# Patient Record
Sex: MALE | Race: WHITE | Employment: FULL TIME | URBAN - METROPOLITAN AREA
[De-identification: names, ages, dates, MRNs, and addresses within clinical notes are randomized per-mention and may not be internally consistent; named-entity substitution may affect disease eponyms.]

---

## 2020-08-10 ENCOUNTER — APPOINTMENT (OUTPATIENT)
Dept: GENERAL RADIOLOGY | Age: 40
End: 2020-08-10
Attending: EMERGENCY MEDICINE
Payer: COMMERCIAL

## 2020-08-10 ENCOUNTER — HOSPITAL ENCOUNTER (EMERGENCY)
Age: 40
Discharge: LEFT BEFORE DISCHARGE | End: 2020-08-10
Attending: EMERGENCY MEDICINE
Payer: COMMERCIAL

## 2020-08-10 VITALS
RESPIRATION RATE: 20 BRPM | BODY MASS INDEX: 31.83 KG/M2 | HEART RATE: 101 BPM | SYSTOLIC BLOOD PRESSURE: 152 MMHG | OXYGEN SATURATION: 96 % | HEIGHT: 72 IN | TEMPERATURE: 99.3 F | DIASTOLIC BLOOD PRESSURE: 82 MMHG | WEIGHT: 235 LBS

## 2020-08-10 DIAGNOSIS — R00.2 PALPITATIONS: Primary | ICD-10-CM

## 2020-08-10 DIAGNOSIS — R07.9 CHEST PAIN, UNSPECIFIED TYPE: ICD-10-CM

## 2020-08-10 LAB
ALBUMIN SERPL-MCNC: 3.9 G/DL (ref 3.5–5)
ALBUMIN/GLOB SERPL: 1 {RATIO} (ref 1.2–3.5)
ALP SERPL-CCNC: 95 U/L (ref 50–136)
ALT SERPL-CCNC: 45 U/L (ref 12–65)
ANION GAP SERPL CALC-SCNC: 11 MMOL/L (ref 7–16)
AST SERPL-CCNC: 29 U/L (ref 15–37)
ATRIAL RATE: 129 BPM
BASOPHILS # BLD: 0.1 K/UL (ref 0–0.2)
BASOPHILS NFR BLD: 1 % (ref 0–2)
BILIRUB SERPL-MCNC: 0.2 MG/DL (ref 0.2–1.1)
BUN SERPL-MCNC: 12 MG/DL (ref 6–23)
CALCIUM SERPL-MCNC: 8 MG/DL (ref 8.3–10.4)
CALCULATED P AXIS, ECG09: 56 DEGREES
CALCULATED R AXIS, ECG10: 58 DEGREES
CALCULATED T AXIS, ECG11: -4 DEGREES
CHLORIDE SERPL-SCNC: 102 MMOL/L (ref 98–107)
CO2 SERPL-SCNC: 26 MMOL/L (ref 21–32)
CREAT SERPL-MCNC: 1.23 MG/DL (ref 0.8–1.5)
D DIMER PPP FEU-MCNC: 0.28 UG/ML(FEU)
DIAGNOSIS, 93000: NORMAL
DIFFERENTIAL METHOD BLD: ABNORMAL
EOSINOPHIL # BLD: 0.1 K/UL (ref 0–0.8)
EOSINOPHIL NFR BLD: 1 % (ref 0.5–7.8)
ERYTHROCYTE [DISTWIDTH] IN BLOOD BY AUTOMATED COUNT: 13.2 % (ref 11.9–14.6)
GLOBULIN SER CALC-MCNC: 3.8 G/DL (ref 2.3–3.5)
GLUCOSE SERPL-MCNC: 100 MG/DL (ref 65–100)
HCT VFR BLD AUTO: 43.3 % (ref 41.1–50.3)
HGB BLD-MCNC: 14.6 G/DL (ref 13.6–17.2)
IMM GRANULOCYTES # BLD AUTO: 0 K/UL (ref 0–0.5)
IMM GRANULOCYTES NFR BLD AUTO: 0 % (ref 0–5)
LYMPHOCYTES # BLD: 4.2 K/UL (ref 0.5–4.6)
LYMPHOCYTES NFR BLD: 47 % (ref 13–44)
MCH RBC QN AUTO: 29.9 PG (ref 26.1–32.9)
MCHC RBC AUTO-ENTMCNC: 33.7 G/DL (ref 31.4–35)
MCV RBC AUTO: 88.5 FL (ref 79.6–97.8)
MONOCYTES # BLD: 0.6 K/UL (ref 0.1–1.3)
MONOCYTES NFR BLD: 7 % (ref 4–12)
NEUTS SEG # BLD: 4 K/UL (ref 1.7–8.2)
NEUTS SEG NFR BLD: 44 % (ref 43–78)
NRBC # BLD: 0 K/UL (ref 0–0.2)
P-R INTERVAL, ECG05: 150 MS
PLATELET # BLD AUTO: 405 K/UL (ref 150–450)
PMV BLD AUTO: 9.6 FL (ref 9.4–12.3)
POTASSIUM SERPL-SCNC: 3.6 MMOL/L (ref 3.5–5.1)
PROT SERPL-MCNC: 7.7 G/DL (ref 6.3–8.2)
Q-T INTERVAL, ECG07: 300 MS
QRS DURATION, ECG06: 88 MS
QTC CALCULATION (BEZET), ECG08: 439 MS
RBC # BLD AUTO: 4.89 M/UL (ref 4.23–5.6)
SODIUM SERPL-SCNC: 139 MMOL/L (ref 136–145)
TROPONIN-HIGH SENSITIVITY: 72.4 PG/ML (ref 0–14)
VENTRICULAR RATE, ECG03: 129 BPM
WBC # BLD AUTO: 9 K/UL (ref 4.3–11.1)

## 2020-08-10 PROCEDURE — 71045 X-RAY EXAM CHEST 1 VIEW: CPT

## 2020-08-10 PROCEDURE — 74011250636 HC RX REV CODE- 250/636: Performed by: EMERGENCY MEDICINE

## 2020-08-10 PROCEDURE — 84484 ASSAY OF TROPONIN QUANT: CPT

## 2020-08-10 PROCEDURE — 85025 COMPLETE CBC W/AUTO DIFF WBC: CPT

## 2020-08-10 PROCEDURE — 93005 ELECTROCARDIOGRAM TRACING: CPT | Performed by: EMERGENCY MEDICINE

## 2020-08-10 PROCEDURE — 99285 EMERGENCY DEPT VISIT HI MDM: CPT

## 2020-08-10 PROCEDURE — 80053 COMPREHEN METABOLIC PANEL: CPT

## 2020-08-10 PROCEDURE — 85379 FIBRIN DEGRADATION QUANT: CPT

## 2020-08-10 RX ADMIN — SODIUM CHLORIDE, SODIUM LACTATE, POTASSIUM CHLORIDE, AND CALCIUM CHLORIDE 1000 ML: 600; 310; 30; 20 INJECTION, SOLUTION INTRAVENOUS at 16:03

## 2020-08-10 NOTE — ED NOTES
RN walked by pt. Pt had removed IV and was standing by bed. MD bedside. Pt expressed that he wanted to leave. This RN and MD urged pt to stay to receive results of labs/speak with cardiology. Pt still wanting to leave.

## 2020-08-10 NOTE — ED TRIAGE NOTES
Patient ambulatory to triage without difficulty with mask in place. Patient reports left sided chest pain since Friday. Reports n/v/d and dizziness. Denies SOB, cough or fever.

## 2020-08-10 NOTE — ED PROVIDER NOTES
Sole Flanagan is a 42 y.o. male seen on 8/10/2020 at 3:38 PM in the [facility] EMERGENCY DEPT in room ERJ/J.    Chief Complaint   Patient presents with    Chest Pain     HPI: 53-year-old male presenting to the emergency department for evaluation of palpitations and chest pain. He states this is been going on for a couple of weeks. He recently was seen by [facility] cardiology and had a 2-day Holter monitor. This showed maximum heart rate of 162. He notes he is having chest discomfort as well as nausea and vomiting occasionally. He is not had any recent fractures, surgeries, no history of DVT no unilateral leg swelling or edema. He does note some shortness of breath when his heart starts to race. He notes this is worse with exertion, and when it is hot outside. Historian: patient    REVIEW OF SYSTEMS     Review of Systems   Constitutional: Negative for fatigue and fever. HENT: Negative. Eyes: Negative. Respiratory: Positive for shortness of breath. Negative for cough, chest tightness and wheezing. Cardiovascular: Positive for chest pain and palpitations. Negative for leg swelling. Gastrointestinal: Negative for abdominal distention, abdominal pain, diarrhea, nausea and vomiting. Genitourinary: Negative for dysuria, flank pain, frequency, genital sores and urgency. Musculoskeletal: Negative. Skin: Negative for rash. Neurological: Negative for dizziness, syncope and headaches. All other systems reviewed and are negative. PAST MEDICAL HISTORY     Past Medical History:   Diagnosis Date    ETOH abuse     Hypertension      No past surgical history on file.   Social History     Socioeconomic History    Marital status: SINGLE     Spouse name: Not on file    Number of children: Not on file    Years of education: Not on file    Highest education level: Not on file   Substance and Sexual Activity    Alcohol use: Yes     Comment: daily, 3\4 of a 5th    Drug use: No     Prior to Admission Medications   Prescriptions Last Dose Informant Patient Reported? Taking? ranitidine (ZANTAC) 150 mg tablet   No No   Sig: Take 1 Tab by mouth two (2) times a day. Facility-Administered Medications: None     Allergies   Allergen Reactions    Celebrex [Celecoxib] Other (comments)    Pcn [Penicillins] Other (comments)        PHYSICAL EXAM       Vitals:    08/10/20 1400 08/10/20 1505   BP: 125/86 (!) 149/91   Pulse: (!) 134 (!) 108   Resp: 20    Temp: 99.3 °F (37.4 °C)    SpO2: 95% 96%    Vital signs were reviewed. Physical Exam  Vitals signs reviewed. Constitutional:       General: He is not in acute distress. Appearance: He is well-developed. He is not diaphoretic. HENT:      Head: Normocephalic and atraumatic. Eyes:      Pupils: Pupils are equal, round, and reactive to light. Neck:      Musculoskeletal: Normal range of motion and neck supple. Cardiovascular:      Rate and Rhythm: Regular rhythm. Tachycardia present. Heart sounds: Normal heart sounds. No murmur. No friction rub. No gallop. Pulmonary:      Effort: Pulmonary effort is normal. No respiratory distress. Breath sounds: Normal breath sounds. No wheezing. Abdominal:      General: Bowel sounds are normal. There is no distension. Palpations: Abdomen is soft. There is no mass. Tenderness: There is no abdominal tenderness. There is no guarding or rebound. Musculoskeletal: Normal range of motion. General: No tenderness or deformity. Skin:     General: Skin is warm. Findings: No erythema or rash. Neurological:      Mental Status: He is alert and oriented to person, place, and time. Sensory: No sensory deficit. Coordination: Coordination normal.      Comments: No focal deficits   Psychiatric:         Behavior: Behavior normal.         Thought Content:  Thought content normal.          MEDICAL DECISION MAKING     MDM  Number of Diagnoses or Management Options  Chest pain, unspecified type:   Palpitations:      Amount and/or Complexity of Data Reviewed  Clinical lab tests: ordered and reviewed  Tests in the radiology section of CPT®: ordered and reviewed  Review and summarize past medical records: yes  Discuss the patient with other providers: yes    Risk of Complications, Morbidity, and/or Mortality  Presenting problems: high  Diagnostic procedures: moderate  Management options: moderate      Procedures    ED Course:  Pt not hypoxic, though tachy with CP. No significant PE risk factors. However, he  I have added a d-dimer. 4:21 PM  Pt noted to have an elevated hs-trop. Discussed with cardiology, they will see pt in the ED.    5:14 PM  I was called to the bedside by the nurse who states that the patient has become upset with his length of stay. He is pulled the IV out of his arm and states that he needs to leave the hospital.  I explained to him that I was concerned about a possible pulmonary embolism. Cardiology has come to the emergency department to evaluate him. He understands that not completing his care could result in the inability to diagnose a potentially life-threatening cause of symptoms. Disposition:  AMA  Diagnosis:  Chest pain, palpitations  ____________________________________________________________________  A portion of this note was generated using voice recognition dictation software. While the note has been reviewed for accuracy, please note certain words and phrases may not be transcribed as intended and some grammatical and/or typographical errors may be present.

## 2020-08-10 NOTE — DISCHARGE INSTRUCTIONS
You have chosen to leave [address] We have discussed the fact that this means we may not be able to identify the cause of your symptoms which could potentially be life-threatening. This could result in a very bad outcome including possible death. If you change your mind please return to the emergency department for further evaluation.

## 2020-08-11 ENCOUNTER — PATIENT OUTREACH (OUTPATIENT)
Dept: CASE MANAGEMENT | Age: 40
End: 2020-08-11

## 2020-08-11 NOTE — PROGRESS NOTES
1st Attempt to contact patient for [facility] Covid 19 risk education call, no answer on mobile number with message left on V/M.  Will attempt to contact patient again within 24 hours

## 2020-08-12 ENCOUNTER — PATIENT OUTREACH (OUTPATIENT)
Dept: CASE MANAGEMENT | Age: 40
End: 2020-08-12

## 2020-08-12 NOTE — PROGRESS NOTES
2nd  attempt to contact patient for [facility] Covid 19 education Call. No answer, on home number . Episode will be closed at this time as [address] has been unsuccessful in reaching the patient.  CC will be removed from the care team.

## 2021-06-23 PROBLEM — G47.26 SHIFT WORK SLEEP DISORDER: Status: ACTIVE | Noted: 2021-06-23

## 2021-06-23 PROBLEM — G47.33 OSA (OBSTRUCTIVE SLEEP APNEA): Status: ACTIVE | Noted: 2021-06-23

## 2021-06-23 PROBLEM — G47.00 PERSISTENT DISORDER OF INITIATING OR MAINTAINING SLEEP: Status: ACTIVE | Noted: 2021-06-23

## 2021-07-11 ENCOUNTER — APPOINTMENT (OUTPATIENT)
Dept: GENERAL RADIOLOGY | Age: 41
DRG: 917 | End: 2021-07-11
Attending: EMERGENCY MEDICINE
Payer: COMMERCIAL

## 2021-07-11 ENCOUNTER — HOSPITAL ENCOUNTER (INPATIENT)
Age: 41
LOS: 1 days | Discharge: LEFT BEFORE DISCHARGE | DRG: 917 | End: 2021-07-12
Attending: EMERGENCY MEDICINE | Admitting: HOSPITALIST
Payer: COMMERCIAL

## 2021-07-11 DIAGNOSIS — K29.21 ACUTE ALCOHOLIC GASTRITIS WITH HEMORRHAGE: ICD-10-CM

## 2021-07-11 DIAGNOSIS — T51.2X1A ISOPROPYL ALCOHOL POISONING: Primary | ICD-10-CM

## 2021-07-11 DIAGNOSIS — N17.9 ACUTE KIDNEY INJURY (HCC): ICD-10-CM

## 2021-07-11 LAB
ALBUMIN SERPL-MCNC: 4.2 G/DL (ref 3.5–5)
ALBUMIN/GLOB SERPL: 1 {RATIO} (ref 1.2–3.5)
ALP SERPL-CCNC: 95 U/L (ref 50–136)
ALT SERPL-CCNC: 70 U/L (ref 12–65)
ANION GAP SERPL CALC-SCNC: 10 MMOL/L (ref 7–16)
ANION GAP SERPL CALC-SCNC: 13 MMOL/L (ref 7–16)
APPEARANCE UR: CLEAR
AST SERPL-CCNC: 30 U/L (ref 15–37)
BACTERIA URNS QL MICRO: 0 /HPF
BASE EXCESS BLDV CALC-SCNC: 5 MMOL/L
BASOPHILS # BLD: 0 K/UL (ref 0–0.2)
BASOPHILS NFR BLD: 0 % (ref 0–2)
BILIRUB SERPL-MCNC: 0.5 MG/DL (ref 0.2–1.1)
BILIRUB UR QL: ABNORMAL
BNP SERPL-MCNC: 12 PG/ML (ref 5–125)
BUN SERPL-MCNC: 9 MG/DL (ref 6–23)
BUN SERPL-MCNC: 9 MG/DL (ref 6–23)
CALCIUM SERPL-MCNC: 7.8 MG/DL (ref 8.3–10.4)
CALCIUM SERPL-MCNC: 8.8 MG/DL (ref 8.3–10.4)
CASTS URNS QL MICRO: ABNORMAL /LPF
CHLORIDE SERPL-SCNC: 102 MMOL/L (ref 98–107)
CHLORIDE SERPL-SCNC: 98 MMOL/L (ref 98–107)
CO2 BLD-SCNC: 22 MMOL/L (ref 13–23)
CO2 SERPL-SCNC: 25 MMOL/L (ref 21–32)
CO2 SERPL-SCNC: 28 MMOL/L (ref 21–32)
COLOR UR: ABNORMAL
CREAT SERPL-MCNC: 1.94 MG/DL (ref 0.8–1.5)
CREAT SERPL-MCNC: 2.47 MG/DL (ref 0.8–1.5)
CREAT UR-MCNC: 367 MG/DL
DIFFERENTIAL METHOD BLD: ABNORMAL
EOSINOPHIL # BLD: 0 K/UL (ref 0–0.8)
EOSINOPHIL NFR BLD: 0 % (ref 0.5–7.8)
EPI CELLS #/AREA URNS HPF: ABNORMAL /HPF
ERYTHROCYTE [DISTWIDTH] IN BLOOD BY AUTOMATED COUNT: 14.8 % (ref 11.9–14.6)
ETHANOL SERPL-MCNC: <3 MG/DL
GAS FLOW.O2 O2 DELIVERY SYS: ABNORMAL L/MIN
GLOBULIN SER CALC-MCNC: 4.1 G/DL (ref 2.3–3.5)
GLUCOSE SERPL-MCNC: 128 MG/DL (ref 65–100)
GLUCOSE SERPL-MCNC: 161 MG/DL (ref 65–100)
GLUCOSE UR STRIP.AUTO-MCNC: NEGATIVE MG/DL
HCO3 BLDV-SCNC: 22 MMOL/L (ref 23–28)
HCT VFR BLD AUTO: 54.7 % (ref 41.1–50.3)
HGB BLD-MCNC: 15.9 G/DL (ref 13.6–17.2)
HGB BLD-MCNC: 18.6 G/DL (ref 13.6–17.2)
HGB UR QL STRIP: NEGATIVE
IMM GRANULOCYTES # BLD AUTO: 0.1 K/UL (ref 0–0.5)
IMM GRANULOCYTES NFR BLD AUTO: 1 % (ref 0–5)
KETONES UR QL STRIP.AUTO: >80 MG/DL
LEUKOCYTE ESTERASE UR QL STRIP.AUTO: NEGATIVE
LIPASE SERPL-CCNC: 141 U/L (ref 73–393)
LYMPHOCYTES # BLD: 1.6 K/UL (ref 0.5–4.6)
LYMPHOCYTES NFR BLD: 12 % (ref 13–44)
MAGNESIUM SERPL-MCNC: 2 MG/DL (ref 1.8–2.4)
MCH RBC QN AUTO: 30.4 PG (ref 26.1–32.9)
MCHC RBC AUTO-ENTMCNC: 34 G/DL (ref 31.4–35)
MCV RBC AUTO: 89.4 FL (ref 79.6–97.8)
MONOCYTES # BLD: 0.6 K/UL (ref 0.1–1.3)
MONOCYTES NFR BLD: 5 % (ref 4–12)
NEUTS SEG # BLD: 11 K/UL (ref 1.7–8.2)
NEUTS SEG NFR BLD: 83 % (ref 43–78)
NITRITE UR QL STRIP.AUTO: NEGATIVE
NRBC # BLD: 0 K/UL (ref 0–0.2)
PCO2 BLDV: 18.7 MMHG (ref 41–51)
PH BLDV: 7.68 [PH] (ref 7.32–7.42)
PH UR STRIP: 8 [PH] (ref 5–9)
PLATELET # BLD AUTO: 497 K/UL (ref 150–450)
PMV BLD AUTO: 9.3 FL (ref 9.4–12.3)
PO2 BLDV: 29 MMHG
POTASSIUM SERPL-SCNC: 3.2 MMOL/L (ref 3.5–5.1)
POTASSIUM SERPL-SCNC: 3.4 MMOL/L (ref 3.5–5.1)
PROT SERPL-MCNC: 8.3 G/DL (ref 6.3–8.2)
PROT UR STRIP-MCNC: 100 MG/DL
RBC # BLD AUTO: 6.12 M/UL (ref 4.23–5.6)
RBC #/AREA URNS HPF: ABNORMAL /HPF
SAO2 % BLDV: 74.2 % (ref 65–88)
SERVICE CMNT-IMP: ABNORMAL
SERVICE CMNT-IMP: ABNORMAL
SODIUM SERPL-SCNC: 136 MMOL/L (ref 138–145)
SODIUM SERPL-SCNC: 140 MMOL/L (ref 138–145)
SODIUM UR-SCNC: 59 MMOL/L
SP GR UR REFRACTOMETRY: 1.03 (ref 1–1.02)
SPECIMEN TYPE: ABNORMAL
UROBILINOGEN UR QL STRIP.AUTO: 1 EU/DL (ref 0.2–1)
WBC # BLD AUTO: 13.2 K/UL (ref 4.3–11.1)
WBC URNS QL MICRO: ABNORMAL /HPF

## 2021-07-11 PROCEDURE — 82803 BLOOD GASES ANY COMBINATION: CPT

## 2021-07-11 PROCEDURE — 84300 ASSAY OF URINE SODIUM: CPT

## 2021-07-11 PROCEDURE — 74011000250 HC RX REV CODE- 250: Performed by: HOSPITALIST

## 2021-07-11 PROCEDURE — 82570 ASSAY OF URINE CREATININE: CPT

## 2021-07-11 PROCEDURE — C9113 INJ PANTOPRAZOLE SODIUM, VIA: HCPCS | Performed by: HOSPITALIST

## 2021-07-11 PROCEDURE — C9113 INJ PANTOPRAZOLE SODIUM, VIA: HCPCS | Performed by: EMERGENCY MEDICINE

## 2021-07-11 PROCEDURE — 83735 ASSAY OF MAGNESIUM: CPT

## 2021-07-11 PROCEDURE — 83690 ASSAY OF LIPASE: CPT

## 2021-07-11 PROCEDURE — 74011250636 HC RX REV CODE- 250/636: Performed by: HOSPITALIST

## 2021-07-11 PROCEDURE — 96375 TX/PRO/DX INJ NEW DRUG ADDON: CPT

## 2021-07-11 PROCEDURE — 82077 ASSAY SPEC XCP UR&BREATH IA: CPT

## 2021-07-11 PROCEDURE — 99284 EMERGENCY DEPT VISIT MOD MDM: CPT

## 2021-07-11 PROCEDURE — 74022 RADEX COMPL AQT ABD SERIES: CPT

## 2021-07-11 PROCEDURE — 85018 HEMOGLOBIN: CPT

## 2021-07-11 PROCEDURE — 96361 HYDRATE IV INFUSION ADD-ON: CPT

## 2021-07-11 PROCEDURE — 85025 COMPLETE CBC W/AUTO DIFF WBC: CPT

## 2021-07-11 PROCEDURE — 80053 COMPREHEN METABOLIC PANEL: CPT

## 2021-07-11 PROCEDURE — 74011000250 HC RX REV CODE- 250: Performed by: EMERGENCY MEDICINE

## 2021-07-11 PROCEDURE — 36415 COLL VENOUS BLD VENIPUNCTURE: CPT

## 2021-07-11 PROCEDURE — 65660000000 HC RM CCU STEPDOWN

## 2021-07-11 PROCEDURE — 83880 ASSAY OF NATRIURETIC PEPTIDE: CPT

## 2021-07-11 PROCEDURE — 74011250637 HC RX REV CODE- 250/637: Performed by: HOSPITALIST

## 2021-07-11 PROCEDURE — 81001 URINALYSIS AUTO W/SCOPE: CPT

## 2021-07-11 PROCEDURE — 96374 THER/PROPH/DIAG INJ IV PUSH: CPT

## 2021-07-11 PROCEDURE — 74011250636 HC RX REV CODE- 250/636: Performed by: EMERGENCY MEDICINE

## 2021-07-11 RX ORDER — LORAZEPAM 2 MG/ML
1 INJECTION INTRAMUSCULAR
Status: DISCONTINUED | OUTPATIENT
Start: 2021-07-11 | End: 2021-07-12 | Stop reason: HOSPADM

## 2021-07-11 RX ORDER — SODIUM CHLORIDE 9 MG/ML
150 INJECTION, SOLUTION INTRAVENOUS CONTINUOUS
Status: DISCONTINUED | OUTPATIENT
Start: 2021-07-11 | End: 2021-07-12 | Stop reason: HOSPADM

## 2021-07-11 RX ORDER — LORAZEPAM 2 MG/ML
1 INJECTION INTRAMUSCULAR
Status: COMPLETED | OUTPATIENT
Start: 2021-07-11 | End: 2021-07-11

## 2021-07-11 RX ORDER — SODIUM CHLORIDE 0.9 % (FLUSH) 0.9 %
5-40 SYRINGE (ML) INJECTION AS NEEDED
Status: DISCONTINUED | OUTPATIENT
Start: 2021-07-11 | End: 2021-07-12 | Stop reason: HOSPADM

## 2021-07-11 RX ORDER — ONDANSETRON 2 MG/ML
4 INJECTION INTRAMUSCULAR; INTRAVENOUS
Status: DISCONTINUED | OUTPATIENT
Start: 2021-07-11 | End: 2021-07-12 | Stop reason: HOSPADM

## 2021-07-11 RX ORDER — LANOLIN ALCOHOL/MO/W.PET/CERES
100 CREAM (GRAM) TOPICAL DAILY
Status: DISCONTINUED | OUTPATIENT
Start: 2021-07-11 | End: 2021-07-12 | Stop reason: HOSPADM

## 2021-07-11 RX ORDER — ONDANSETRON 2 MG/ML
4 INJECTION INTRAMUSCULAR; INTRAVENOUS
Status: COMPLETED | OUTPATIENT
Start: 2021-07-11 | End: 2021-07-11

## 2021-07-11 RX ORDER — MORPHINE SULFATE 4 MG/ML
4 INJECTION INTRAVENOUS
Status: COMPLETED | OUTPATIENT
Start: 2021-07-11 | End: 2021-07-11

## 2021-07-11 RX ORDER — SENNOSIDES 8.6 MG/1
2 TABLET ORAL
Status: DISCONTINUED | OUTPATIENT
Start: 2021-07-11 | End: 2021-07-12 | Stop reason: HOSPADM

## 2021-07-11 RX ORDER — CHLORDIAZEPOXIDE HYDROCHLORIDE 5 MG/1
10 CAPSULE, GELATIN COATED ORAL 3 TIMES DAILY
Status: DISCONTINUED | OUTPATIENT
Start: 2021-07-11 | End: 2021-07-12 | Stop reason: HOSPADM

## 2021-07-11 RX ORDER — SODIUM CHLORIDE 0.9 % (FLUSH) 0.9 %
5-40 SYRINGE (ML) INJECTION EVERY 8 HOURS
Status: DISCONTINUED | OUTPATIENT
Start: 2021-07-11 | End: 2021-07-12 | Stop reason: HOSPADM

## 2021-07-11 RX ADMIN — Medication 100 MG: at 18:13

## 2021-07-11 RX ADMIN — ONDANSETRON 4 MG: 2 INJECTION INTRAMUSCULAR; INTRAVENOUS at 20:47

## 2021-07-11 RX ADMIN — LORAZEPAM 1 MG: 2 INJECTION INTRAMUSCULAR; INTRAVENOUS at 23:42

## 2021-07-11 RX ADMIN — SODIUM CHLORIDE 40 MG: 9 INJECTION, SOLUTION INTRAMUSCULAR; INTRAVENOUS; SUBCUTANEOUS at 20:47

## 2021-07-11 RX ADMIN — LORAZEPAM 1 MG: 2 INJECTION INTRAMUSCULAR; INTRAVENOUS at 14:43

## 2021-07-11 RX ADMIN — ONDANSETRON 4 MG: 2 INJECTION INTRAMUSCULAR; INTRAVENOUS at 14:43

## 2021-07-11 RX ADMIN — SODIUM CHLORIDE 1000 ML: 900 INJECTION, SOLUTION INTRAVENOUS at 16:23

## 2021-07-11 RX ADMIN — CHLORDIAZEPOXIDE HYDROCHLORIDE 10 MG: 5 CAPSULE ORAL at 18:13

## 2021-07-11 RX ADMIN — SODIUM CHLORIDE 150 ML/HR: 9 INJECTION, SOLUTION INTRAVENOUS at 16:05

## 2021-07-11 RX ADMIN — MORPHINE SULFATE 4 MG: 4 INJECTION INTRAVENOUS at 16:23

## 2021-07-11 RX ADMIN — CHLORDIAZEPOXIDE HYDROCHLORIDE 10 MG: 5 CAPSULE ORAL at 21:04

## 2021-07-11 RX ADMIN — SODIUM CHLORIDE 1000 ML: 900 INJECTION, SOLUTION INTRAVENOUS at 14:43

## 2021-07-11 RX ADMIN — SODIUM CHLORIDE 10 ML: 9 INJECTION, SOLUTION INTRAMUSCULAR; INTRAVENOUS; SUBCUTANEOUS at 18:30

## 2021-07-11 RX ADMIN — PANTOPRAZOLE SODIUM 80 MG: 40 INJECTION, POWDER, FOR SOLUTION INTRAVENOUS at 16:23

## 2021-07-11 RX ADMIN — LORAZEPAM 1 MG: 2 INJECTION INTRAMUSCULAR; INTRAVENOUS at 18:13

## 2021-07-11 RX ADMIN — SODIUM CHLORIDE 10 ML: 9 INJECTION, SOLUTION INTRAMUSCULAR; INTRAVENOUS; SUBCUTANEOUS at 21:04

## 2021-07-11 NOTE — ED NOTES
Patient noted to have period of hypoxia. Patient alert able to interact appropriately. Per wife patient with history of sleep apnea and CPAP at home. Patient placed on 2L NC while sleeping o2 sat improved to 98%. MD notified.

## 2021-07-11 NOTE — ED PROVIDER NOTES
29-year-old white male history of alcohol abuse presents with nausea vomiting and abdominal pain over the past 2 days. States he drinks fairly heavily daily and yesterday did not have any alcohol so drank a half a bottle of rubbing alcohol. Pain is epigastric and left lower quadrant. No diarrhea, fever or urinary changes. Also reports seeing some red and black blood in his emesis. The history is provided by the patient. Abdominal Pain   Associated symptoms include nausea and vomiting. Pertinent negatives include no fever, no dysuria, no headaches, no chest pain and no back pain. Vomiting   Associated symptoms include abdominal pain. Pertinent negatives include no fever, no headaches, no cough and no headaches. Past Medical History:   Diagnosis Date    ETOH abuse     Hypertension     Sleep apnea        No past surgical history on file. Family History:   Problem Relation Age of Onset    Hypertension Father     Stroke Father        Social History     Socioeconomic History    Marital status: SINGLE     Spouse name: Not on file    Number of children: Not on file    Years of education: Not on file    Highest education level: Not on file   Occupational History    Not on file   Tobacco Use    Smoking status: Never Smoker    Smokeless tobacco: Never Used   Substance and Sexual Activity    Alcohol use: Not Currently     Comment: daily, 3\4 of a 5th    Drug use: No    Sexual activity: Not on file   Other Topics Concern    Not on file   Social History Narrative    Not on file     Social Determinants of Health     Financial Resource Strain:     Difficulty of Paying Living Expenses:    Food Insecurity:     Worried About Running Out of Food in the Last Year:     [address] in the Last Year:    Transportation Needs:     Lack of Transportation (Medical):      Lack of Transportation (Non-Medical):    Physical Activity:     Days of Exercise per Week:     Minutes of Exercise per Session:    Stress:     Feeling of Stress :    Social Connections:     Frequency of Communication with Friends and Family:     Frequency of Social Gatherings with Friends and Family:     Attends Sabianism Services:     Active Member of Clubs or Organizations:     Attends Club or Organization Meetings:     Marital Status:    Intimate Partner Violence:     Fear of Current or Ex-Partner:     Emotionally Abused:     Physically Abused:     Sexually Abused: ALLERGIES: Celebrex [celecoxib] and Pcn [penicillins]    Review of Systems   Constitutional: Negative for fever. HENT: Negative for congestion. Respiratory: Negative for cough and shortness of breath. Cardiovascular: Negative for chest pain. Gastrointestinal: Positive for abdominal pain, nausea and vomiting. Genitourinary: Negative for dysuria. Musculoskeletal: Negative for back pain. Skin: Negative for rash. Neurological: Negative for headaches. All other systems reviewed and are negative. Vitals:    07/11/21 1258   BP: 114/74   Pulse: (!) 121   Resp: 16   Temp: 98.1 °F (36.7 °C)   SpO2: 97%   Weight: 106.6 kg (235 lb)   Height: 6' (1.829 m)            Physical Exam  Vitals and nursing note reviewed. Constitutional:       General: He is not in acute distress. Appearance: Normal appearance. He is not toxic-appearing. HENT:      Head: Normocephalic and atraumatic. Nose: Nose normal.      Mouth/Throat:      Mouth: Mucous membranes are moist.      Pharynx: Oropharynx is clear. Eyes:      Conjunctiva/sclera: Conjunctivae normal.      Pupils: Pupils are equal, round, and reactive to light. Cardiovascular:      Rate and Rhythm: Tachycardia present. Heart sounds: No murmur heard. Pulmonary:      Effort: Pulmonary effort is normal.      Breath sounds: Normal breath sounds. Abdominal:      Palpations: Abdomen is soft. Tenderness: There is abdominal tenderness in the epigastric area.  There is no right CVA tenderness, left CVA tenderness, guarding or rebound. Musculoskeletal:         General: No swelling. Normal range of motion. Cervical back: Normal range of motion and neck supple. Skin:     General: Skin is warm and dry. Neurological:      Mental Status: He is alert and oriented to person, place, and time. Psychiatric:         Mood and Affect: Mood normal.         Behavior: Behavior normal.          MDM  Number of Diagnoses or Management Options  Diagnosis management comments: Blood work shows leukocytosis 13.2, elevated H&H 18.6 and 54.7 and elevated platelets 310. Basic panel shows elevated creatinine 2.47 glucose 161 normal anion gap and CO2. LFTs normal.  Alcohol 0. Patient treated with IV fluids, morphine, Zofran and Ativan. Continues to complain of abdominal pain and is still tachycardic. Due to elevated creatinine will discuss with hospitalist for admission for treatment of isopropyl alcohol poisoning and suspected gastritis.        Amount and/or Complexity of Data Reviewed  Clinical lab tests: ordered and reviewed  Tests in the medicine section of CPT®: ordered and reviewed    Risk of Complications, Morbidity, and/or Mortality  Presenting problems: high  Diagnostic procedures: high  Management options: high           Procedures

## 2021-07-11 NOTE — PROGRESS NOTES
Provided urine specimen cup and instructed to collect urine sample. Applied bilateral SCDs. Dual full body skin assessment completed by Bradley Dsouza RN and Lew Yap RN. Elbows intact without redness or breakdown. Sacrum intact without redness or breakdown. Bilateral heels intact with dry callous skin.

## 2021-07-11 NOTE — ROUTINE PROCESS
TRANSFER - IN REPORT:    Verbal report received from DOLORES Johnson (name) on Ricardo Altman  being received from ED (unit) for routine progression of care      Report consisted of patients Situation, Background, Assessment and   Recommendations(SBAR). Information from the following report(s) SBAR, Kardex and ED Summary was reviewed with the receiving nurse. Opportunity for questions and clarification was provided. Assessment completed upon patients arrival to unit and care assumed. SBAR received and given to primary receiving RN, Sridhar Rojas. Patient not on 8th @ transfer-in time.

## 2021-07-11 NOTE — ED TRIAGE NOTES
Pt ambulatory to triage. Pt states he has n/v with blood in emesis x 10 hours. Denies diarrhea. Pt reports abd pain in LLQ. Reports drinking ETOH yesterday and feels like he may have drank too much. Pt denies daily drinking. Pt states \"I have a drinking problem\" and reports binge drinking about once every 2 weeks. Pt last had withdrawals from ETOH about a week ago. Pt states he would like help for his ETOH problem. Pt has been to rehab before.

## 2021-07-11 NOTE — H&P
INTERNAL MEDICINE H&P/CONSULT    Subjective:     46-year-old white male history of alcohol abuse, LOWELL, presents with nausea vomiting and abdominal pain over the past 2 days. States he drinks fairly heavily daily and yesterday did not have any alcohol so drank a half a bottle of rubbing alcohol. Pain is epigastric and left lower quadrant. No diarrhea, fever or urinary changes. Also reports seeing some red and black blood in his emesis. On further questioning, he vomited pur blood for last 3 days w/ out reporting diarrhea or black stools. He states he had bled from stomach ulcers in the past but never had endoscopy done. He states he do not drink alcohol daily but not infrequently he does to deal with ''anxiety''. Past Medical History:   Diagnosis Date    ETOH abuse     Hypertension     Sleep apnea       No past surgical history on file. Prior to Admission medications    Medication Sig Start Date End Date Taking? Authorizing Provider   Trintellix 20 mg tablet  6/7/21   Provider, Historical     Allergies   Allergen Reactions    Celebrex [Celecoxib] Other (comments)     Patient states he is not allergic     Pcn [Penicillins] Other (comments)     Patient states he is not allergic. Social History     Tobacco Use    Smoking status: Never Smoker    Smokeless tobacco: Never Used   Substance Use Topics    Alcohol use: Not Currently     Comment: daily, 3\4 of a 5th        Family History:  HTN    Review of Systems   A comprehensive review of systems was negative except for that written in the HPI. Objective: Intake / Output:  07/11 0701 - 07/11 1900  In: 1000 [I.V.:1000]  Out: -   No intake/output data recorded.     Physical Exam:  Visit Vitals  /71   Pulse (!) 121   Temp 98.1 °F (36.7 °C)   Resp 16   Ht 6' (1.829 m)   Wt 106.6 kg (235 lb)   SpO2 99%   BMI 31.87 kg/m²     General appearance: awake, alert, cooperative, moderate distress, appears stated age - not Pale, No icterus, Dry mucous membranes, obese  Head: Normocephalic, without obvious abnormality, atraumatic  Back: symmetric, no curvature. ROM normal. No CVA tenderness. Lungs: clear to auscultation bilaterally   Heart: regular rate and rhythm, S1, S2 normal, no murmur, click, rub or gallop  Abdomen: soft, minimal epigastric tenderness, no distension, normal bowel sound, no masses, no organomegaly  Extremities: atraumatic, no cyanosis - Bilateral lower limbs edema none. Skin: No rashes or ulceration. Neurologic: Grossly intact     ECG: sinus rhythm     Data Review (Labs):   Recent Results (from the past 24 hour(s))   CBC WITH AUTOMATED DIFF    Collection Time: 07/11/21  1:01 PM   Result Value Ref Range    WBC 13.2 (H) 4.3 - 11.1 K/uL    RBC 6.12 (H) 4.23 - 5.6 M/uL    HGB 18.6 (H) 13.6 - 17.2 g/dL    HCT 54.7 (H) 41.1 - 50.3 %    MCV 89.4 79.6 - 97.8 FL    MCH 30.4 26.1 - 32.9 PG    MCHC 34.0 31.4 - 35.0 g/dL    RDW 14.8 (H) 11.9 - 14.6 %    PLATELET 637 (H) 145 - 450 K/uL    MPV 9.3 (L) 9.4 - 12.3 FL    ABSOLUTE NRBC 0.00 0.0 - 0.2 K/uL    DF AUTOMATED      NEUTROPHILS 83 (H) 43 - 78 %    LYMPHOCYTES 12 (L) 13 - 44 %    MONOCYTES 5 4.0 - 12.0 %    EOSINOPHILS 0 (L) 0.5 - 7.8 %    BASOPHILS 0 0.0 - 2.0 %    IMMATURE GRANULOCYTES 1 0.0 - 5.0 %    ABS. NEUTROPHILS 11.0 (H) 1.7 - 8.2 K/UL    ABS. LYMPHOCYTES 1.6 0.5 - 4.6 K/UL    ABS. MONOCYTES 0.6 0.1 - 1.3 K/UL    ABS. EOSINOPHILS 0.0 0.0 - 0.8 K/UL    ABS. BASOPHILS 0.0 0.0 - 0.2 K/UL    ABS. IMM.  GRANS. 0.1 0.0 - 0.5 K/UL   METABOLIC PANEL, COMPREHENSIVE    Collection Time: 07/11/21  1:01 PM   Result Value Ref Range    Sodium 136 (L) 138 - 145 mmol/L    Potassium 3.4 (L) 3.5 - 5.1 mmol/L    Chloride 98 98 - 107 mmol/L    CO2 25 21 - 32 mmol/L    Anion gap 13 7 - 16 mmol/L    Glucose 161 (H) 65 - 100 mg/dL    BUN 9 6 - 23 MG/DL    Creatinine 2.47 (H) 0.8 - 1.5 MG/DL    GFR est AA 38 (L) >60 ml/min/1.73m2    GFR est non-AA 31 (L) >60 ml/min/1.73m2    Calcium 8.8 8.3 - 10.4 MG/DL Bilirubin, total 0.5 0.2 - 1.1 MG/DL    ALT (SGPT) 70 (H) 12 - 65 U/L    AST (SGOT) 30 15 - 37 U/L    Alk. phosphatase 95 50 - 136 U/L    Protein, total 8.3 (H) 6.3 - 8.2 g/dL    Albumin 4.2 3.5 - 5.0 g/dL    Globulin 4.1 (H) 2.3 - 3.5 g/dL    A-G Ratio 1.0 (L) 1.2 - 3.5     ETHYL ALCOHOL    Collection Time: 07/11/21  1:01 PM   Result Value Ref Range    ALCOHOL(ETHYL),SERUM <3 MG/DL   LIPASE    Collection Time: 07/11/21  1:01 PM   Result Value Ref Range    Lipase 141 73 - 393 U/L   MAGNESIUM    Collection Time: 07/11/21  1:01 PM   Result Value Ref Range    Magnesium 2.0 1.8 - 2.4 mg/dL   POC VENOUS BLOOD GAS    Collection Time: 07/11/21  2:54 PM   Result Value Ref Range    Device: ROOM AIR      pH, venous (POC) 7.68 (H) 7.32 - 7.42      pCO2, venous (POC) 18.7 (L) 41 - 51 MMHG    pO2, venous (POC) 29 mmHg    HCO3, venous (POC) 22.0 (L) 23 - 28 MMOL/L    sO2, venous (POC) 74.2 65 - 88 %    Base excess, venous (POC) 5.0 mmol/L    Specimen type (POC) VENOUS BLOOD      Performed by Bekah     CO2, POC 22 13 - 23 MMOL/L    Critical value read back JAMES        Assessment:     1- Isopropyl (rubbing) alcohol use. Hx of frequent alcohol use, possibly binging. [place] 2- HUMERA (acute kidney injury) due to #1, possibly oliguric (hx unclear)  3- Hematemesis, no apparent active bleeding so far despite history, stable Hb.  4- LOWELL    Plan:     Telemetry  Seizure precautions  Follow urine studies  Alcohol withdrawal preventive measures  NPO tonight  Monitor Hb  PPi iv  IVF hydration, generous  IO monitor  AM lab  Need nephrology consult if AM lab do not improve  Continue essential home medications. Patient is full code. Further management depends on patient progress. Thank you for the oppourtinity to contribute in the care of your patient.     Signed By: Afsaneh Figueroa MD     July 11, 2021

## 2021-07-12 ENCOUNTER — ANESTHESIA EVENT (OUTPATIENT)
Dept: ENDOSCOPY | Age: 41
DRG: 917 | End: 2021-07-12
Payer: COMMERCIAL

## 2021-07-12 ENCOUNTER — ANESTHESIA (OUTPATIENT)
Dept: ENDOSCOPY | Age: 41
DRG: 917 | End: 2021-07-12
Payer: COMMERCIAL

## 2021-07-12 VITALS
RESPIRATION RATE: 14 BRPM | SYSTOLIC BLOOD PRESSURE: 117 MMHG | HEART RATE: 101 BPM | HEIGHT: 72 IN | DIASTOLIC BLOOD PRESSURE: 76 MMHG | BODY MASS INDEX: 31.83 KG/M2 | OXYGEN SATURATION: 94 % | TEMPERATURE: 98.2 F | WEIGHT: 235 LBS

## 2021-07-12 PROBLEM — T51.2X4A: Status: ACTIVE | Noted: 2021-07-12

## 2021-07-12 LAB
ALBUMIN SERPL-MCNC: 3.1 G/DL (ref 3.5–5)
ALBUMIN/GLOB SERPL: 1 {RATIO} (ref 1.2–3.5)
ALP SERPL-CCNC: 69 U/L (ref 50–136)
ALT SERPL-CCNC: 46 U/L (ref 12–65)
ANION GAP SERPL CALC-SCNC: 5 MMOL/L (ref 7–16)
AST SERPL-CCNC: 17 U/L (ref 15–37)
BILIRUB DIRECT SERPL-MCNC: 0.1 MG/DL
BILIRUB SERPL-MCNC: 0.6 MG/DL (ref 0.2–1.1)
BUN SERPL-MCNC: 5 MG/DL (ref 6–23)
CALCIUM SERPL-MCNC: 7.1 MG/DL (ref 8.3–10.4)
CHLORIDE SERPL-SCNC: 106 MMOL/L (ref 98–107)
CO2 SERPL-SCNC: 29 MMOL/L (ref 21–32)
CREAT SERPL-MCNC: 1.88 MG/DL (ref 0.8–1.5)
ERYTHROCYTE [DISTWIDTH] IN BLOOD BY AUTOMATED COUNT: 15.4 % (ref 11.9–14.6)
GLOBULIN SER CALC-MCNC: 3.2 G/DL (ref 2.3–3.5)
GLUCOSE SERPL-MCNC: 95 MG/DL (ref 65–100)
HCT VFR BLD AUTO: 44.2 % (ref 41.1–50.3)
HGB BLD-MCNC: 14.5 G/DL (ref 13.6–17.2)
MCH RBC QN AUTO: 30.4 PG (ref 26.1–32.9)
MCHC RBC AUTO-ENTMCNC: 32.8 G/DL (ref 31.4–35)
MCV RBC AUTO: 92.7 FL (ref 79.6–97.8)
NRBC # BLD: 0 K/UL (ref 0–0.2)
PLATELET # BLD AUTO: 325 K/UL (ref 150–450)
PMV BLD AUTO: 9.9 FL (ref 9.4–12.3)
POTASSIUM SERPL-SCNC: 3.3 MMOL/L (ref 3.5–5.1)
PROT SERPL-MCNC: 6.3 G/DL (ref 6.3–8.2)
RBC # BLD AUTO: 4.77 M/UL (ref 4.23–5.6)
SODIUM SERPL-SCNC: 140 MMOL/L (ref 136–145)
WBC # BLD AUTO: 13.2 K/UL (ref 4.3–11.1)

## 2021-07-12 PROCEDURE — 74011000250 HC RX REV CODE- 250: Performed by: NURSE ANESTHETIST, CERTIFIED REGISTERED

## 2021-07-12 PROCEDURE — 0DB68ZX EXCISION OF STOMACH, VIA NATURAL OR ARTIFICIAL OPENING ENDOSCOPIC, DIAGNOSTIC: ICD-10-PCS | Performed by: STUDENT IN AN ORGANIZED HEALTH CARE EDUCATION/TRAINING PROGRAM

## 2021-07-12 PROCEDURE — 88305 TISSUE EXAM BY PATHOLOGIST: CPT

## 2021-07-12 PROCEDURE — 85027 COMPLETE CBC AUTOMATED: CPT

## 2021-07-12 PROCEDURE — 76040000025: Performed by: STUDENT IN AN ORGANIZED HEALTH CARE EDUCATION/TRAINING PROGRAM

## 2021-07-12 PROCEDURE — 0DB58ZX EXCISION OF ESOPHAGUS, VIA NATURAL OR ARTIFICIAL OPENING ENDOSCOPIC, DIAGNOSTIC: ICD-10-PCS | Performed by: STUDENT IN AN ORGANIZED HEALTH CARE EDUCATION/TRAINING PROGRAM

## 2021-07-12 PROCEDURE — 74011250636 HC RX REV CODE- 250/636: Performed by: HOSPITALIST

## 2021-07-12 PROCEDURE — 76060000031 HC ANESTHESIA FIRST 0.5 HR: Performed by: STUDENT IN AN ORGANIZED HEALTH CARE EDUCATION/TRAINING PROGRAM

## 2021-07-12 PROCEDURE — 77030021593 HC FCPS BIOP ENDOSC BSC -A: Performed by: STUDENT IN AN ORGANIZED HEALTH CARE EDUCATION/TRAINING PROGRAM

## 2021-07-12 PROCEDURE — 36415 COLL VENOUS BLD VENIPUNCTURE: CPT

## 2021-07-12 PROCEDURE — 74011000250 HC RX REV CODE- 250: Performed by: HOSPITALIST

## 2021-07-12 PROCEDURE — 74011250636 HC RX REV CODE- 250/636: Performed by: NURSE ANESTHETIST, CERTIFIED REGISTERED

## 2021-07-12 PROCEDURE — C9113 INJ PANTOPRAZOLE SODIUM, VIA: HCPCS | Performed by: HOSPITALIST

## 2021-07-12 PROCEDURE — 88312 SPECIAL STAINS GROUP 1: CPT

## 2021-07-12 PROCEDURE — 80076 HEPATIC FUNCTION PANEL: CPT

## 2021-07-12 PROCEDURE — 2709999900 HC NON-CHARGEABLE SUPPLY: Performed by: STUDENT IN AN ORGANIZED HEALTH CARE EDUCATION/TRAINING PROGRAM

## 2021-07-12 PROCEDURE — 80048 BASIC METABOLIC PNL TOTAL CA: CPT

## 2021-07-12 RX ORDER — ESMOLOL HYDROCHLORIDE 10 MG/ML
INJECTION INTRAVENOUS AS NEEDED
Status: DISCONTINUED | OUTPATIENT
Start: 2021-07-12 | End: 2021-07-12 | Stop reason: HOSPADM

## 2021-07-12 RX ORDER — LIDOCAINE HYDROCHLORIDE 20 MG/ML
INJECTION, SOLUTION EPIDURAL; INFILTRATION; INTRACAUDAL; PERINEURAL AS NEEDED
Status: DISCONTINUED | OUTPATIENT
Start: 2021-07-12 | End: 2021-07-12 | Stop reason: HOSPADM

## 2021-07-12 RX ORDER — PROPOFOL 10 MG/ML
INJECTION, EMULSION INTRAVENOUS
Status: DISCONTINUED | OUTPATIENT
Start: 2021-07-12 | End: 2021-07-12 | Stop reason: HOSPADM

## 2021-07-12 RX ORDER — MIDAZOLAM HYDROCHLORIDE 1 MG/ML
INJECTION, SOLUTION INTRAMUSCULAR; INTRAVENOUS
Status: COMPLETED
Start: 2021-07-12 | End: 2021-07-12

## 2021-07-12 RX ORDER — MIDAZOLAM HYDROCHLORIDE 1 MG/ML
INJECTION, SOLUTION INTRAMUSCULAR; INTRAVENOUS AS NEEDED
Status: DISCONTINUED | OUTPATIENT
Start: 2021-07-12 | End: 2021-07-12 | Stop reason: HOSPADM

## 2021-07-12 RX ORDER — PROPOFOL 10 MG/ML
INJECTION, EMULSION INTRAVENOUS AS NEEDED
Status: DISCONTINUED | OUTPATIENT
Start: 2021-07-12 | End: 2021-07-12 | Stop reason: HOSPADM

## 2021-07-12 RX ADMIN — ONDANSETRON 4 MG: 2 INJECTION INTRAMUSCULAR; INTRAVENOUS at 08:34

## 2021-07-12 RX ADMIN — LIDOCAINE HYDROCHLORIDE 100 MG: 20 INJECTION, SOLUTION EPIDURAL; INFILTRATION; INTRACAUDAL; PERINEURAL at 13:05

## 2021-07-12 RX ADMIN — MIDAZOLAM 2 MG: 1 INJECTION INTRAMUSCULAR; INTRAVENOUS at 13:05

## 2021-07-12 RX ADMIN — LORAZEPAM 1 MG: 2 INJECTION INTRAMUSCULAR; INTRAVENOUS at 14:12

## 2021-07-12 RX ADMIN — ESMOLOL HYDROCHLORIDE 30 MG: 10 INJECTION, SOLUTION INTRAVENOUS at 13:13

## 2021-07-12 RX ADMIN — SODIUM CHLORIDE 10 ML: 9 INJECTION, SOLUTION INTRAMUSCULAR; INTRAVENOUS; SUBCUTANEOUS at 06:09

## 2021-07-12 RX ADMIN — SODIUM CHLORIDE 10 ML: 9 INJECTION, SOLUTION INTRAMUSCULAR; INTRAVENOUS; SUBCUTANEOUS at 15:17

## 2021-07-12 RX ADMIN — PROPOFOL 150 MG: 10 INJECTION, EMULSION INTRAVENOUS at 13:05

## 2021-07-12 RX ADMIN — PROPOFOL 250 MCG/KG/MIN: 10 INJECTION, EMULSION INTRAVENOUS at 13:05

## 2021-07-12 RX ADMIN — SODIUM CHLORIDE 40 MG: 9 INJECTION, SOLUTION INTRAMUSCULAR; INTRAVENOUS; SUBCUTANEOUS at 08:38

## 2021-07-12 NOTE — PROCEDURES
ESOPHAGOGASTRODUODENOSCOPY    DATE of PROCEDURE:   7/12/2021    PRE-OP DIAGNOSIS:  1. Hematemesis  2. Melena     POST-OP DIAGNOSIS:  1. LA class D esophagitis  2. Hiatal hernia  3. Several small, clean-based duodenal ulcers    MEDICATIONS ADMINISTERED:   MAC per anesthesia    INSTRUMENT:   [id number]    PROCEDURE:    After obtaining informed consent, the patient was placed in the left lateral position and sedated. The endoscope was advanced to the 2nd portion of the duodenum under direct vision without difficulty. The esophagus, stomach (including retroflexed views) and duodenum were evaluated. The patient was taken to the recovery area in stable condition. FINDINGS:  ESOPHAGUS: LA class D esophagitis was encountered in the distal esophagus measuring approximately 5 cm in length, extending from 35 cm to 40 cm from the incisors. Biopsies were obtained. Otherwise normal proximal and mid esophagus with no varices appreciated. STOMACH: The flap-valve was considered Hill grade III on retroflexed view, no varices appreciated. Otherwise normal gastric mucosa. Random gastric biopsies were obtained. DUODENUM: A few small, clean-based ulcers were scattered throughout the bulb. Otherwise normal 2nd portion.     ESTIMATED BLOOD LOSS:  Minimal.    PLAN:  - Follow up pathology, will treat H pylori if positive  - Change Pantoprazole from IV to PO 40 mg ACB/S once able to tolerate PO, continue until repeat exam  - Repeat exam in 8-12 weeks  - Resume diet  - Avoid NSAIDs  - Complete alcohol cessation  - Antireflux measures  - Will sign off and arrange clinic visit in 2-4 weeks      Nafisa Pugh MD  [facility]

## 2021-07-12 NOTE — PROGRESS NOTES
Care Management Interventions  PCP Verified by CM: Yes  Physical Therapy Consult: No  Occupational Therapy Consult: No  Speech Therapy Consult: No  Current Support Network: Other  Freedom of Choice List was Provided with Basic Dialogue that Supports the Patient's Individualized Plan of Care/Goals, Treatment Preferences and Shares the Quality Data Associated with the Providers?: Yes  [place] Resource Information Provided?: No  Discharge Location  Discharge Placement: Home  Patient is alert and oriented in all spheres. He lives with his fiance (at bedside). Employed full time. Drives. Confirmed patient has a pcp. DME: cpap  Patient was given information on recovery programs. CM following for discharge needs.

## 2021-07-12 NOTE — PROGRESS NOTES
reviewed notes for spiritual concerns   noted history of alcohol abuse   patient says he drinks to deal with his anxiety and depression   also has acute kidney injury   will continue to assess how we can best serve this family

## 2021-07-12 NOTE — ANESTHESIA POSTPROCEDURE EVALUATION
Procedure(s):  ESOPHAGOGASTRODUODENOSCOPY (EGD) ROOM 824  ESOPHAGOGASTRODUODENAL (EGD) BIOPSY. total IV anesthesia    Anesthesia Post Evaluation      Multimodal analgesia: multimodal analgesia used between 6 hours prior to anesthesia start to PACU discharge  Patient location during evaluation: bedside  Patient participation: complete - patient participated  Level of consciousness: awake and alert  Pain management: adequate  Airway patency: patent  Anesthetic complications: no  Cardiovascular status: acceptable  Respiratory status: acceptable  Hydration status: acceptable  Post anesthesia nausea and vomiting:  controlled  Final Post Anesthesia Temperature Assessment:  Normothermia (36.0-37.5 degrees C)      INITIAL Post-op Vital signs:   Vitals Value Taken Time   BP 93/62 07/12/21 1350   Temp 36.7 °C (98 °F) 07/12/21 1322   Pulse 64 07/12/21 1356   Resp 12 07/12/21 1350   SpO2 97 % 07/12/21 1356   Vitals shown include unvalidated device data.

## 2021-07-12 NOTE — INTERVAL H&P NOTE
Update History & Physical    The Patient's History and Physical of July 11, 2021 was reviewed with the patient and I examined the patient. There was no change. The surgical site was confirmed by the patient and me. Plan:  The risk, benefits, expected outcome, and alternative to the recommended procedure have been discussed with the patient. Patient understands and wants to proceed with the procedure.     Electronically signed by Wayne Castillo MD on 7/12/2021 at 12:55 PM

## 2021-07-12 NOTE — PROGRESS NOTES
Hospitalist Progress Note     Name:  Suzette Callaway  Age:40 y.o. Sex:male   :  1980    MRN:  [id number]   Admit Date:  2021  Presenting Complaint: Abdominal Pain and Vomiting    Initial Admission Diagnosis: HUMERA (acute kidney injury) [facility]) [N17.9]     Hospital Course/Interval history:     Mr. Kathryn Sebastian is a 48 yo male with PMH of depression, anxiety, heavy ETOH use who presented with c/o hematemesis, melena after drinking rubbing alcohol. He reports his girlfriend took the credit card to stop him from drinking alcohol (wine) and instead he drank a bottle of rubbing alcohol. C/O severe abd pain, hematemesis and melena. He was started on protonix, librium scheduled and ativan prn. Subjective (21):    7  Anxious this morning. Pending EGD. Girlfriend at bedside. Hgb stable. Denies CP, SOB. Does have \"stomach burning\". Review of Systems: 14 point review of systems is otherwise negative with the exception of the elements mentioned above. Assessment & Plan     Isopropyl alcohol intoxication     GI consulted, plans for EGD today.   Protonix    Alcohol use   cessation discussed, pt was receptive  Continue librium and prn ativan    HUMERA   improving on IVF    Hypokalemia   check Mag, replace K, BMP in AM          Dispo/Discharge Planning:  DC 1-2 days     Diet:  DIET NPO Ice Chips, Sips of Water with Meds  DVT PPx: SCDs  Code status: Full Code    Objective:     Patient Vitals for the past 24 hrs:   Temp Pulse Resp BP SpO2   21 1113 98.8 °F (37.1 °C) 92 16 98/65 92 %   21 0819 98.6 °F (37 °C) 94 16 108/62 96 %   21 0409 98.4 °F (36.9 °C) (!) 109 16 130/73 98 %   21 2353 98.1 °F (36.7 °C) (!) 109 16 127/73 97 %   21 2033 97.3 °F (36.3 °C) (!) 108 16 123/73 99 %   21 1746 97.9 °F (36.6 °C) (!) 113 18 136/77 99 %   21 1636    132/72 98 %   21 1625    125/72 92 %   21 1536    129/71 99 %   21 1506    127/70 93 %   07/11/21 1436    125/67 100 %   07/11/21 1258 98.1 °F (36.7 °C) (!) 121 16 114/74 97 %     Oxygen Therapy  O2 Sat (%): 92 % (07/12/21 1113)  Pulse via Oximetry: 107 beats per minute (07/11/21 1636)  O2 Device: Nasal cannula (07/12/21 0727)  O2 Flow Rate (L/min): 2 l/min (07/12/21 0727)    Body mass index is 31.87 kg/m². Physical Exam:   General:  No acute distress, speaking in full sentences, no use of accessory muscles. anxious   Lungs:  Clear to auscultation bilaterally. resp even and nonalbored   CV:  Regular rate and rhythm with normal S1 and S2 no murmurs rubs gallops. Abdomen:  Soft, nontender, nondistended, normoactive bowel sounds   Extremities:  No cyanosis clubbing or edema   Neuro:  Nonfocal, A&O x3   Psych:  Normal affect     Data Review:  I have reviewed all labs, meds, and studies from the last 24 hours:    Labs:    Recent Results (from the past 24 hour(s))   CBC WITH AUTOMATED DIFF    Collection Time: 07/11/21  1:01 PM   Result Value Ref Range    WBC 13.2 (H) 4.3 - 11.1 K/uL    RBC 6.12 (H) 4.23 - 5.6 M/uL    HGB 18.6 (H) 13.6 - 17.2 g/dL    HCT 54.7 (H) 41.1 - 50.3 %    MCV 89.4 79.6 - 97.8 FL    MCH 30.4 26.1 - 32.9 PG    MCHC 34.0 31.4 - 35.0 g/dL    RDW 14.8 (H) 11.9 - 14.6 %    PLATELET 823 (H) 348 - 450 K/uL    MPV 9.3 (L) 9.4 - 12.3 FL    ABSOLUTE NRBC 0.00 0.0 - 0.2 K/uL    DF AUTOMATED      NEUTROPHILS 83 (H) 43 - 78 %    LYMPHOCYTES 12 (L) 13 - 44 %    MONOCYTES 5 4.0 - 12.0 %    EOSINOPHILS 0 (L) 0.5 - 7.8 %    BASOPHILS 0 0.0 - 2.0 %    IMMATURE GRANULOCYTES 1 0.0 - 5.0 %    ABS. NEUTROPHILS 11.0 (H) 1.7 - 8.2 K/UL    ABS. LYMPHOCYTES 1.6 0.5 - 4.6 K/UL    ABS. MONOCYTES 0.6 0.1 - 1.3 K/UL    ABS. EOSINOPHILS 0.0 0.0 - 0.8 K/UL    ABS. BASOPHILS 0.0 0.0 - 0.2 K/UL    ABS. IMM.  GRANS. 0.1 0.0 - 0.5 K/UL   METABOLIC PANEL, COMPREHENSIVE    Collection Time: 07/11/21  1:01 PM   Result Value Ref Range    Sodium 136 (L) 138 - 145 mmol/L    Potassium 3.4 (L) 3.5 - 5.1 mmol/L    Chloride 98 98 - 107 mmol/L    CO2 25 21 - 32 mmol/L    Anion gap 13 7 - 16 mmol/L    Glucose 161 (H) 65 - 100 mg/dL    BUN 9 6 - 23 MG/DL    Creatinine 2.47 (H) 0.8 - 1.5 MG/DL    GFR est AA 38 (L) >60 ml/min/1.73m2    GFR est non-AA 31 (L) >60 ml/min/1.73m2    Calcium 8.8 8.3 - 10.4 MG/DL    Bilirubin, total 0.5 0.2 - 1.1 MG/DL    ALT (SGPT) 70 (H) 12 - 65 U/L    AST (SGOT) 30 15 - 37 U/L    Alk.  phosphatase 95 50 - 136 U/L    Protein, total 8.3 (H) 6.3 - 8.2 g/dL    Albumin 4.2 3.5 - 5.0 g/dL    Globulin 4.1 (H) 2.3 - 3.5 g/dL    A-G Ratio 1.0 (L) 1.2 - 3.5     ETHYL ALCOHOL    Collection Time: 07/11/21  1:01 PM   Result Value Ref Range    ALCOHOL(ETHYL),SERUM <3 MG/DL   LIPASE    Collection Time: 07/11/21  1:01 PM   Result Value Ref Range    Lipase 141 73 - 393 U/L   MAGNESIUM    Collection Time: 07/11/21  1:01 PM   Result Value Ref Range    Magnesium 2.0 1.8 - 2.4 mg/dL   POC VENOUS BLOOD GAS    Collection Time: 07/11/21  2:54 PM   Result Value Ref Range    Device: ROOM AIR      pH, venous (POC) 7.68 (H) 7.32 - 7.42      pCO2, venous (POC) 18.7 (L) 41 - 51 MMHG    pO2, venous (POC) 29 mmHg    HCO3, venous (POC) 22.0 (L) 23 - 28 MMOL/L    sO2, venous (POC) 74.2 65 - 88 %    Base excess, venous (POC) 5.0 mmol/L    Specimen type (POC) VENOUS BLOOD      Performed by Bekah     CO2, POC 22 13 - 23 MMOL/L    Critical value read back 108 [facility]    NT-PRO BNP    Collection Time: 07/11/21  5:38 PM   Result Value Ref Range    NT pro-BNP 12 5 - 837 PG/ML   METABOLIC PANEL, BASIC    Collection Time: 07/11/21  5:45 PM   Result Value Ref Range    Sodium 140 138 - 145 mmol/L    Potassium 3.2 (L) 3.5 - 5.1 mmol/L    Chloride 102 98 - 107 mmol/L    CO2 28 21 - 32 mmol/L    Anion gap 10 7 - 16 mmol/L    Glucose 128 (H) 65 - 100 mg/dL    BUN 9 6 - 23 MG/DL    Creatinine 1.94 (H) 0.8 - 1.5 MG/DL    GFR est AA 50 (L) >60 ml/min/1.73m2    GFR est non-AA 41 (L) >60 ml/min/1.73m2    Calcium 7.8 (L) 8.3 - 10.4 MG/DL   URINALYSIS W/ RFLX MICROSCOPIC    Collection Time: 07/11/21  6:51 PM   Result Value Ref Range    Color ANYA      Appearance CLEAR      Specific gravity 1.027 (H) 1.001 - 1.023      pH (UA) 8.0 5.0 - 9.0      Protein 100 (A) NEG mg/dL    Glucose Negative mg/dL    Ketone >80 (A) NEG mg/dL    Bilirubin SMALL (A) NEG      Blood Negative NEG      Urobilinogen 1.0 0.2 - 1.0 EU/dL    Nitrites Negative NEG      Leukocyte Esterase Negative NEG      WBC 0-3 0 /hpf    RBC 0-3 0 /hpf    Epithelial cells 0-3 0 /hpf    Bacteria 0 0 /hpf    Casts 5-10 0 /lpf   CREATININE, UR, RANDOM    Collection Time: 07/11/21  6:51 PM   Result Value Ref Range    Creatinine, urine 367.00 mg/dL   SODIUM, UR, RANDOM    Collection Time: 07/11/21  6:51 PM   Result Value Ref Range    Sodium,urine random 59 MMOL/L   HEMOGLOBIN    Collection Time: 07/11/21 10:03 PM   Result Value Ref Range    HGB 15.9 13.6 - 66.8 g/dL   METABOLIC PANEL, BASIC    Collection Time: 07/12/21  5:53 AM   Result Value Ref Range    Sodium 140 136 - 145 mmol/L    Potassium 3.3 (L) 3.5 - 5.1 mmol/L    Chloride 106 98 - 107 mmol/L    CO2 29 21 - 32 mmol/L    Anion gap 5 (L) 7 - 16 mmol/L    Glucose 95 65 - 100 mg/dL    BUN 5 (L) 6 - 23 MG/DL    Creatinine 1.88 (H) 0.8 - 1.5 MG/DL    GFR est AA 52 (L) >60 ml/min/1.73m2    GFR est non-AA 43 (L) >60 ml/min/1.73m2    Calcium 7.1 (L) 8.3 - 10.4 MG/DL   CBC W/O DIFF    Collection Time: 07/12/21  5:53 AM   Result Value Ref Range    WBC 13.2 (H) 4.3 - 11.1 K/uL    RBC 4.77 4.23 - 5.6 M/uL    HGB 14.5 13.6 - 17.2 g/dL    HCT 44.2 41.1 - 50.3 %    MCV 92.7 79.6 - 97.8 FL    MCH 30.4 26.1 - 32.9 PG    MCHC 32.8 31.4 - 35.0 g/dL    RDW 15.4 (H) 11.9 - 14.6 %    PLATELET 352 604 - 563 K/uL    MPV 9.9 9.4 - 12.3 FL    ABSOLUTE NRBC 0.00 0.0 - 0.2 K/uL   HEPATIC FUNCTION PANEL    Collection Time: 07/12/21  5:53 AM   Result Value Ref Range    Protein, total 6.3 6.3 - 8.2 g/dL    Albumin 3.1 (L) 3.5 - 5.0 g/dL    Globulin 3.2 2.3 - 3.5 g/dL    A-G Ratio 1.0 (L) 1.2 - 3.5      Bilirubin, total 0.6 0.2 - 1.1 MG/DL    Bilirubin, direct 0.1 <0.4 MG/DL    Alk. phosphatase 69 50 - 136 U/L    AST (SGOT) 17 15 - 37 U/L    ALT (SGPT) 46 12 - 65 U/L       All Micro Results     None          EKG Results     None          Other Studies:  XR ABD ACUTE W 1 V CHEST    Result Date: 7/11/2021  Abdominal Series CLINICAL INDICATION:  Abdominal pain and distention, acute, moderate, generalized; nausea and vomiting, hypoxic, short of breath. Sleep apnea. COMPARISON: Chest radiograph 8/10/2020 TECHNIQUE: A frontal upright view of the chest and supine and upright views of the abdomen were obtained. FINDINGS: The lungs are well inflated. No evidence of pneumothorax, focal infiltrate or effusion. Mediastinal and hilar contours are stable, normal. Bones demonstrate no acute abnormality as seen. Flat and upright views of the abdomen show no evidence of obstruction. No evidence of free air. No abnormal calcifications are seen. Overall small to moderate amount of stool. No acute abnormality in the chest or abdomen.        Current Meds:   Current Facility-Administered Medications   Medication Dose Route Frequency    0.9% sodium chloride infusion  150 mL/hr IntraVENous CONTINUOUS    sodium chloride (NS) flush 5-40 mL  5-40 mL IntraVENous Q8H    sodium chloride (NS) flush 5-40 mL  5-40 mL IntraVENous PRN    senna (SENOKOT) tablet 17.2 mg  2 Tablet Oral BID PRN    ondansetron (ZOFRAN) injection 4 mg  4 mg IntraVENous Q6H PRN    pantoprazole (PROTONIX) 40 mg in 0.9% sodium chloride 10 mL injection  40 mg IntraVENous Q12H    thiamine HCL (B-1) tablet 100 mg  100 mg Oral DAILY    LORazepam (ATIVAN) injection 1 mg  1 mg IntraVENous Q2H PRN    chlordiazePOXIDE (LIBRIUM) capsule 10 mg  10 mg Oral TID       Problem List:  Hospital Problems as of 7/12/2021 Date Reviewed: 6/23/2021        Codes Class Noted - Resolved POA    * (Principal) Toxic effect of 2-Propanol, undetermined intent ICD-10-CM: T51.2X4A  ICD-9-CM: 980.2, E980.9  7/12/2021 - Present Unknown        HUMERA (acute kidney injury) [facility]) ICD-10-CM: N17.9  ICD-9-CM: 584.9  7/11/2021 - Present Unknown               Notes, labs, VS, diagnostic testing reviewed  Case discussed with pt, care team, Dr. Mukesh Shearer, girlfriend at bedside     Signed By: Curtis Martini NP   [facility] SPECIALTY [facility] Hospitalist Service    July 12, 2021  5:15 PM

## 2021-07-12 NOTE — PROGRESS NOTES
TRANSFER - OUT REPORT:    Verbal report given to [facility] RN on Ghanshyam Helm  being transferred to 824(unit) for routine progression of care       Report consisted of patients Situation, Background, Assessment and   Recommendations(SBAR). Information from the following report(s) SBAR, Procedure Summary, Intake/Output, MAR and Recent Results was reviewed with the receiving nurse. Lines:   Peripheral IV 07/11/21 Left Hand (Active)   Site Assessment Clean, dry, & intact 07/12/21 0727   Phlebitis Assessment 0 07/12/21 0727   Infiltration Assessment 0 07/12/21 0727   Dressing Status Clean, dry, & intact 07/12/21 0727   Dressing Type Transparent 07/12/21 0727   Hub Color/Line Status Infusing 07/12/21 0952        Opportunity for questions and clarification was provided.       Patient transported with:   [employer]

## 2021-07-12 NOTE — PROGRESS NOTES
Patient has become very agitated  Says he is leaving  Complains that he has not received any medicine  Reminded he had been given iv ativan and it is time for librium  Refuses librium  Complains that he has not received food  Explained supper will be here before 1730  Refuses to stay  Explained all risk including his kidney function  Refuses to stay  Iv removed tip intact  AMA paper signed and Dr Maya Alcantara notified via perfect serve  Patient refused to wait to talk to physician

## 2021-07-12 NOTE — ADT AUTH CERT NOTES
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   NPO x meds   Follow up Hgb.  Currently normal   Monitor for any signs of recurrent hematemesis   BID PPI   IVF for elevated Cr   Minimal inc ALT, other LFTs NL and NL lipase   Consider EGD in am for multiple UGI symptoms- R/O esophagitis, gastritis, MWT, PUD   Monitor for ETOH withdrawal           Additional Orders:   Daily BMP.  Full code.  SCD's.  Seizure precautions.  Continuous VS.   I and O.  CBC in am.          Renal Failure, Acute - Care Day 1 (7/11/2021) by Brandy Mckeon RN       Review Entered Review Status   7/12/2021 09:55 Completed      Criteria Review      Care Day: 1 Care Date: 7/11/2021 Level of Care:    Guideline Day 1    Level Of Care    (X) ICU or floor    7/12/2021 09:55:44 EDT by Khushboo Dangelo      Telemetry Unit. Clinical Status    ( ) * Clinical Indications met    (X) Possible multiple metabolic abnormalities    7/12/2021 09:55:45 EDT by Khushboo Dangelo      WBC= 13.2.  RBC= 6.12.  Hgb= 18.6.  Hct= 54.7.  Plt= 497.   Na= 136.  K+= 3.4, 3.2.  Glucose= 161.  Cr= 2.47.  GFR= 31.  Baseline labs from 8/2020>  Cr= 1.23.  GFR>60. ALT = 70. Activity    (X) Activity as tolerated    7/12/2021 09:55:45 EDT by Khushboo Dangelo      Activity with assist.    Routes    ( ) Parenteral or oral hydration    7/12/2021 09:55:45 EDT by Cleopatra Needle NPO. (X) Parenteral or oral medications    7/12/2021 09:55:45 EDT by Khushboo Dangelo      NS at 150 ml/hr.  Librium 10 mg po tid.  Protonix 40 mg iv bid.  Thiamin 100 mg po daily.  Ativan 1 mg iv Q 2 prn> recd x 2.  Zofran 4 mg iv Q 6 prn> recdx 1.  Senna prn. ( ) Oral diet as tolerated    7/12/2021 09:55:45 EDT by Cleopatra Singh NPO. Interventions    (X) Urinalysis and other urine tests, CBC, renal function tests, hepatitis B test, other laboratory tests    7/12/2021 09:55:45 EDT by Khushboo Dangelo      Ua>  SG=1.027.  Protein= 100.  Ketones=>80.  Small bili.    Abnormal labs:  WBC= 13.2.  RBC= 6.12.  Hgb= 18.6.  Hct= 54.7.  Plt= 497.   Na= 136.  K+= 3.4, 3.2.  Glucose= 161.  Cr= 2.47.  GFR= 31.  Baseline labs from 8/2020>  Cr= 1.23.  GFR>60. (X) CXR, ECG, renal ultrasound    7/12/2021 09:55:45 EDT by Khushboo Dangelo      AAS/CXR=No acute abnormality in the chest or abdomen. * Milestone   Additional Notes    7/11/21-      PMH:   alcohol abuse, LOWELL and HTN.       Presentation:   presents with nausea vomiting and abdominal pain over the past 2 days.  States he drinks fairly heavily daily and yesterday did not have any alcohol so drank a half a bottle of rubbing alcohol.  Pain is epigastric and left lower quadrant.  No diarrhea, fever or urinary changes.  Also reports seeing some red and black blood in his emesis. On further questioning, he vomited pur blood for last 3 days w/ out reporting diarrhea or black stools. He states he had bled from stomach ulcers in the past but never had endoscopy done. He states he do not drink alcohol daily but not infrequently he does to deal with ''anxiety''. Physical Exam:   General appearance: awake, alert, cooperative, moderate distress, appears stated age - not Pale, No icterus, Dry mucous membranes, obese   Head: Normocephalic, without obvious abnormality, atraumatic   Back: symmetric, no curvature. ROM normal. No CVA tenderness. Lungs: clear to auscultation bilaterally    Heart: regular rate and rhythm, S1, S2 normal, no murmur, click, rub or gallop   Abdomen: soft, minimal epigastric tenderness, no distension, normal bowel sound, no masses, no organomegaly   Extremities: atraumatic, no cyanosis - Bilateral lower limbs edema none. Skin: No rashes or ulceration. Neurologic: Grossly intact           Assessment:       1- Isopropyl (rubbing) alcohol use. Hx of frequent alcohol use, possibly binging. Ching Grumbles 2- HUMERA (acute kidney injury) due to #1, possibly oliguric (hx unclear)   3- Hematemesis, no apparent active bleeding so far despite history, stable Hb.   4- LOWELL       Plan:   Telemetry   Seizure precautions   Follow urine studies   Alcohol withdrawal preventive measures   NPO tonight   Monitor Hb   PPi iv   IVF hydration, generous   IO monitor   AM labs   Need nephrology consult if AM lab do not improve   Continue essential home medications. Patient is full code. Further management depends on patient progress. GI Consult:   Gen:  Alert and oriented, No acute distress. HEENT: Sclerae anicteric.  Conjunctiva pink.  No abnormal pigmentation of the lips.     Neck: The neck is supple.  No cervical or supraclavicular adenopathy.    Cardiovascular: Regular rate and rhythm. No murmurs, gallops, or rubs. Respiratory:  Comfortable breathing, no accessory muscle use. Clear breath sounds bilaterally with no wheezes, rales, or rhonchi. GI:  Abdomen soft, nondistended, mild epigastric TTP.  Normal active bowel sounds. No guarding or rebound, no masses or bruits, no plapable organomegaly. Extremities:  No clubbing or edema.     Skin: No rash or jaundice. Neurological:  Grossly intact without focal deficits. Psychiatric:  Mood appears appropriate and judgement intact. Assessment:   HUMERA   N/V   Epigastric pain   Hematemesis. Plan:   Effects of isopropyl alcohol typically resolve quickly, but can see metabolic effects for some time   NPO x meds   Follow up Hgb.  Currently normal   Monitor for any signs of recurrent hematemesis   BID PPI   IVF for elevated Cr   Minimal inc ALT, other LFTs NL and NL lipase   Consider EGD in am for multiple UGI symptoms- R/O esophagitis, gastritis, MWT, PUD   Monitor for ETOH withdrawal           VS:  98.1- P- 121 - R-  16- 114/74.  O2 sat= 97% on r/a. Abnormal labs:   VBG on r/a>  ph= 7.68.  pCo2= 18.7.  pO2= 29.  HCO3=22.0.           ED Treatment:   NS 1 liter iv bolus x 2.  Zofran 4 mg iv  x 1 . Morphine 4 mg iv x 1.  Protonix 80 mg iv x 1.  NS at 150 ml/hr. Additional Orders:   Daily BMP.  Full code. SCD's.  Seizure precautions.  Continuous VS.              Renal Failure, Acute - Clinical Indications for Admission to Inpatient Care by Paula Benavidez, RN       Review Entered Review Status   7/12/2021 09:51 Completed      Criteria Review      Clinical Indications for Admission to Inpatient Care    Most Recent : Paula Benavidez Most Recent Date: 7/12/2021 09:51:52 EDT     (X) Other Indication: A/P      Entered 7/12/2021 09:51:52 EDT by Paula Benavidez     1- Isopropyl (rubbing) alcohol use. Hx of frequent alcohol use, possibly binging. Raimundo Leigh   2- HUMERA (acute kidney injury) due to #1, possibly oliguric (hx unclear)  3- Hematemesis, no apparent active bleeding so far despite history, stable Hb.  4- LOWELL

## 2021-07-12 NOTE — PROGRESS NOTES
TRANSFER - IN REPORT:    Verbal report received from Amena(name) on Blanch Opitz  being received from endoscopy(unit) for routine progression of care      Report consisted of patients Situation, Background, Assessment and   Recommendations(SBAR). Information from the following report(s) Procedure Summary was reviewed with the receiving nurse. Opportunity for questions and clarification was provided. Assessment completed upon patients arrival to unit and care assumed.

## 2021-07-12 NOTE — ANESTHESIA PREPROCEDURE EVALUATION
Relevant Problems   RESPIRATORY SYSTEM   (+) LOWELL (obstructive sleep apnea)      RENAL FAILURE   (+) HUMERA (acute kidney injury) ([facility] [address]       Anesthetic History   No history of anesthetic complications       Comments: Reports this is first anesthetic. No family hx of problems     Review of Systems / Medical History  Patient summary reviewed, nursing notes reviewed and pertinent labs reviewed    Pulmonary        Sleep apnea: CPAP           Neuro/Psych   Within defined limits           Cardiovascular    Hypertension              Exercise tolerance: >4 METS     GI/Hepatic/Renal         Renal disease: ARF       Endo/Other  Within defined limits           Other Findings   Comments: Heavy ETOH use         Physical Exam    Airway  Mallampati: II  TM Distance: 4 - 6 cm  Neck ROM: normal range of motion   Mouth opening: Normal     Cardiovascular  Regular rate and rhythm,  S1 and S2 normal,  no murmur, click, rub, or gallop             Dental  No notable dental hx       Pulmonary  Breath sounds clear to auscultation               Abdominal         Other Findings            Anesthetic Plan    ASA: 3  Anesthesia type: total IV anesthesia          Induction: Intravenous  Anesthetic plan and risks discussed with: Patient and Family      Denies any emesis ~ 24 hours. Nausea much improved today.

## 2021-07-12 NOTE — ADT AUTH CERT NOTES
[address] [facility]     FACILITY NPI : [id number]  FACILITY TAX ID : [id number]     [address] [place] [address]  [phone]            Patient Name :Will Starks   : 1980 (40 yrs)  MRN : [id number]     Patient Mailing Address [address] [place] [address] [41] , [id number]                 Thank you,   Justice Child.  Treaster  [employer] Authorization ([employer])     P: [phone]  F: [phone]                                   .         Insurance Plan Payor: BLUE CROSS / Plan: [place] OF Stone Rudolph / Product Type: PPO /      Primary Coverage Subscriber ID : [id number]         Current Patient Class : INPATIENT  Admit Date : 2021     REQUESTED LEVEL OF CARE: INPATIENT [101]                                                           Diagnosis : HUMERA (acute kidney injury) ([facility] [address]                          ICD10 Code : HUMERA (acute kidney injury) ([facility] [address] [N17.9]       Admitting and Attending Info:  Admitting Provider : Santosh Perry MD   NPI: [id number]  Admitting Provider Phone. [phone]  Admitting Provider Address: [address] [place] [address] [facility]     Attending Provider Edita Pringle, [address] [place] [address]   [id number]  Attending Provider Address: SAME AS FACILITY

## 2021-07-12 NOTE — PROGRESS NOTES
Quiet in bed  Alert  Chief complaint is nausea  Bilateral scd in place  Continue NS at 150/hr  Site without redness or edema

## 2021-07-12 NOTE — PROGRESS NOTES
TRANSFER - OUT REPORT:    Verbal report given to RN(name) on Xochitl De La Cruz  being transferred to Endoscopy(unit) for ordered procedure       Report consisted of patients Situation, Background, Assessment and   Recommendations(SBAR). Information from the following report(s) SBAR was reviewed with the receiving nurse. Lines:   Peripheral IV 07/11/21 Left Hand (Active)   Site Assessment Clean, dry, & intact 07/12/21 0727   Phlebitis Assessment 0 07/12/21 0727   Infiltration Assessment 0 07/12/21 0727   Dressing Status Clean, dry, & intact 07/12/21 0727   Dressing Type Transparent 07/12/21 0727   Hub Color/Line Status Infusing 07/12/21 3043        Opportunity for questions and clarification was provided.       Patient transported with:
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
## 2021-07-12 NOTE — PROGRESS NOTES
Patient received to room 824 from endoscopy  Alert and oriented  Complains of anxiety  Appears anxious

## 2021-07-12 NOTE — CONSULTS
[facility] Consult Note    Referring Physician: Dr Amena Wilburn    Consult Date: 7/11/2021    Chief Complaint: Hematemesis    Subjective:     History of Present Illness:  Patient is a 47 y.o. who is seen in consultation for hematemesis. He struggles with depression, anxiety, and heavy use of Etoh. His fiance took his credit card this weekend to prevent him purchasing more alcohol. Instead, he drank a bottle of rubbing alcohol Sat night. Since then, he has had severe epigastric abdominal pain, and recurrent N/v. He reports vomiting dark material that \"looked like old blood\", with some streaks of red. This happened several times Sat and today, so he presented to the ER. No prior H/O GI bleeding or PUD. No NSAID use. No dysphagia or weight loss. PMH:  Past Medical History:   Diagnosis Date    ETOH abuse     Hypertension     Sleep apnea        PSH:  No past surgical history on file. Allergies: Allergies   Allergen Reactions    Celebrex [Celecoxib] Other (comments)     Patient states he is not allergic     Pcn [Penicillins] Other (comments)     Patient states he is not allergic. Home Medications:  Prior to Admission Medications   Prescriptions Last Dose Informant Patient Reported? Taking?    Trintellix 20 mg tablet   Yes No      Facility-Administered Medications: None       Hospital Medications:  Current Facility-Administered Medications   Medication Dose Route Frequency    0.9% sodium chloride infusion  150 mL/hr IntraVENous CONTINUOUS    sodium chloride (NS) flush 5-40 mL  5-40 mL IntraVENous Q8H    sodium chloride (NS) flush 5-40 mL  5-40 mL IntraVENous PRN    senna (SENOKOT) tablet 17.2 mg  2 Tablet Oral BID PRN    ondansetron (ZOFRAN) injection 4 mg  4 mg IntraVENous Q6H PRN    pantoprazole (PROTONIX) 40 mg in 0.9% sodium chloride 10 mL injection  40 mg IntraVENous Q12H    thiamine HCL (B-1) tablet 100 mg  100 mg Oral DAILY    LORazepam (ATIVAN) injection 1 mg  1 mg IntraVENous Q2H PRN    chlordiazePOXIDE (LIBRIUM) capsule 10 mg  10 mg Oral TID       Social History:  Social History     Tobacco Use    Smoking status: Never Smoker    Smokeless tobacco: Never Used   Substance Use Topics    Alcohol use: Not Currently     Comment: daily, 3\4 of a 5th       Family History:  Family History   Problem Relation Age of Onset    Hypertension Father     Stroke Father        Review of Systems:  A detailed 10 system ROS is obtained, with pertinent positives as listed in the HPI and PMH. All others are negative. Objective:     Physical Exam:  Vitals:  Visit Vitals  /73 (BP 1 Location: Right arm, BP Patient Position: At rest)   Pulse (!) 108   Temp 97.3 °F (36.3 °C)   Resp 16   Ht 6' (1.829 m)   Wt 106.6 kg (235 lb)   SpO2 99%   BMI 31.87 kg/m²       Intake/Output:  No intake/output data recorded. 07/10 0701 - 07/11 1900  In: 1000 [I.V.:1000]  Out: -     Gen:  Alert and oriented, No acute distress. HEENT: Sclerae anicteric. Conjunctiva pink. No abnormal pigmentation of the lips. Neck: The neck is supple. No cervical or supraclavicular adenopathy. Cardiovascular: Regular rate and rhythm. No murmurs, gallops, or rubs. Respiratory:  Comfortable breathing, no accessory muscle use. Clear breath sounds bilaterally with no wheezes, rales, or rhonchi. GI:  Abdomen soft, nondistended, mild epigastric TTP. Normal active bowel sounds. No guarding or rebound, no masses or bruits, no plapable organomegaly. Extremities:  No clubbing or edema. Skin: No rash or jaundice. Neurological:  Grossly intact without focal deficits. Psychiatric:  Mood appears appropriate and judgement intact.     Laboratory:    Recent Results (from the past 24 hour(s))   CBC WITH AUTOMATED DIFF    Collection Time: 07/11/21  1:01 PM   Result Value Ref Range    WBC 13.2 (H) 4.3 - 11.1 K/uL    RBC 6.12 (H) 4.23 - 5.6 M/uL    HGB 18.6 (H) 13.6 - 17.2 g/dL    HCT 54.7 (H) 41.1 - 50.3 %    MCV 89.4 79.6 - 97.8 FL    MCH 30.4 26.1 - 32.9 PG    MCHC 34.0 31.4 - 35.0 g/dL    RDW 14.8 (H) 11.9 - 14.6 %    PLATELET 933 (H) 803 - 450 K/uL    MPV 9.3 (L) 9.4 - 12.3 FL    ABSOLUTE NRBC 0.00 0.0 - 0.2 K/uL    DF AUTOMATED      NEUTROPHILS 83 (H) 43 - 78 %    LYMPHOCYTES 12 (L) 13 - 44 %    MONOCYTES 5 4.0 - 12.0 %    EOSINOPHILS 0 (L) 0.5 - 7.8 %    BASOPHILS 0 0.0 - 2.0 %    IMMATURE GRANULOCYTES 1 0.0 - 5.0 %    ABS. NEUTROPHILS 11.0 (H) 1.7 - 8.2 K/UL    ABS. LYMPHOCYTES 1.6 0.5 - 4.6 K/UL    ABS. MONOCYTES 0.6 0.1 - 1.3 K/UL    ABS. EOSINOPHILS 0.0 0.0 - 0.8 K/UL    ABS. BASOPHILS 0.0 0.0 - 0.2 K/UL    ABS. IMM. GRANS. 0.1 0.0 - 0.5 K/UL   METABOLIC PANEL, COMPREHENSIVE    Collection Time: 07/11/21  1:01 PM   Result Value Ref Range    Sodium 136 (L) 138 - 145 mmol/L    Potassium 3.4 (L) 3.5 - 5.1 mmol/L    Chloride 98 98 - 107 mmol/L    CO2 25 21 - 32 mmol/L    Anion gap 13 7 - 16 mmol/L    Glucose 161 (H) 65 - 100 mg/dL    BUN 9 6 - 23 MG/DL    Creatinine 2.47 (H) 0.8 - 1.5 MG/DL    GFR est AA 38 (L) >60 ml/min/1.73m2    GFR est non-AA 31 (L) >60 ml/min/1.73m2    Calcium 8.8 8.3 - 10.4 MG/DL    Bilirubin, total 0.5 0.2 - 1.1 MG/DL    ALT (SGPT) 70 (H) 12 - 65 U/L    AST (SGOT) 30 15 - 37 U/L    Alk.  phosphatase 95 50 - 136 U/L    Protein, total 8.3 (H) 6.3 - 8.2 g/dL    Albumin 4.2 3.5 - 5.0 g/dL    Globulin 4.1 (H) 2.3 - 3.5 g/dL    A-G Ratio 1.0 (L) 1.2 - 3.5     ETHYL ALCOHOL    Collection Time: 07/11/21  1:01 PM   Result Value Ref Range    ALCOHOL(ETHYL),SERUM <3 MG/DL   LIPASE    Collection Time: 07/11/21  1:01 PM   Result Value Ref Range    Lipase 141 73 - 393 U/L   MAGNESIUM    Collection Time: 07/11/21  1:01 PM   Result Value Ref Range    Magnesium 2.0 1.8 - 2.4 mg/dL   POC VENOUS BLOOD GAS    Collection Time: 07/11/21  2:54 PM   Result Value Ref Range    Device: ROOM AIR      pH, venous (POC) 7.68 (H) 7.32 - 7.42      pCO2, venous (POC) 18.7 (L) 41 - 51 MMHG    pO2, venous (POC) 29 mmHg    HCO3, venous (POC) 22.0 (L) 23 - 28 MMOL/L    sO2, venous (POC) 74.2 65 - 88 %    Base excess, venous (POC) 5.0 mmol/L    Specimen type (POC) VENOUS BLOOD      Performed by Bekah     CO2, POC 22 13 - 23 MMOL/L    Critical value read back 108 [facility]    NT-PRO BNP    Collection Time: 07/11/21  5:38 PM   Result Value Ref Range    NT pro-BNP 12 5 - 112 PG/ML   METABOLIC PANEL, BASIC    Collection Time: 07/11/21  5:45 PM   Result Value Ref Range    Sodium 140 138 - 145 mmol/L    Potassium 3.2 (L) 3.5 - 5.1 mmol/L    Chloride 102 98 - 107 mmol/L    CO2 28 21 - 32 mmol/L    Anion gap 10 7 - 16 mmol/L    Glucose 128 (H) 65 - 100 mg/dL    BUN 9 6 - 23 MG/DL    Creatinine 1.94 (H) 0.8 - 1.5 MG/DL    GFR est AA 50 (L) >60 ml/min/1.73m2    GFR est non-AA 41 (L) >60 ml/min/1.73m2    Calcium 7.8 (L) 8.3 - 10.4 MG/DL   URINALYSIS W/ RFLX MICROSCOPIC    Collection Time: 07/11/21  6:51 PM   Result Value Ref Range    Color ANYA      Appearance CLEAR      Specific gravity 1.027 (H) 1.001 - 1.023      pH (UA) 8.0 5.0 - 9.0      Protein 100 (A) NEG mg/dL    Glucose Negative mg/dL    Ketone >80 (A) NEG mg/dL    Bilirubin SMALL (A) NEG      Blood Negative NEG      Urobilinogen 1.0 0.2 - 1.0 EU/dL    Nitrites Negative NEG      Leukocyte Esterase Negative NEG      WBC 0-3 0 /hpf    RBC 0-3 0 /hpf    Epithelial cells 0-3 0 /hpf    Bacteria 0 0 /hpf    Casts 5-10 0 /lpf   CREATININE, UR, RANDOM    Collection Time: 07/11/21  6:51 PM   Result Value Ref Range    Creatinine, urine 367.00 mg/dL   SODIUM, UR, RANDOM    Collection Time: 07/11/21  6:51 PM   Result Value Ref Range    Sodium,urine random 59 MMOL/L       Assessment:       Active Problems:    [facility] (7/11/2021)    N/V  Epigastric pain  Hematemesis    Plan:       Effects of isopropyl alcohol typically resolve quickly, but can see metabolic effects for some time  NPO x meds  Follow up Hgb.   Currently normal  Monitor for any signs of recurrent hematemesis  BID PPI  IVF for elevated Cr  Minimal inc ALT, other LFTs NL and NL lipase  Consider EGD in am for multiple UGI symptoms- R/O esophagitis, gastritis, MWT, PUD  Monitor for ETOH withdrawal        Pedrito Butcher MD  [facility]

## 2021-07-12 NOTE — ROUTINE PROCESS
Patient transported back to room 824 via stretcher with transporter. VSS on room air, no c/o pain or discomfort. Dr. Sarika Shi spoke with the patient and family at bedside. Report called to RN on 8th floor.

## 2022-03-18 PROBLEM — T51.2X4A: Status: ACTIVE | Noted: 2021-07-12

## 2022-03-18 PROBLEM — G47.33 OSA (OBSTRUCTIVE SLEEP APNEA): Status: ACTIVE | Noted: 2021-06-23

## 2022-03-19 PROBLEM — N17.9 AKI (ACUTE KIDNEY INJURY) (HCC): Status: ACTIVE | Noted: 2021-07-11

## 2022-03-19 PROBLEM — G47.26 SHIFT WORK SLEEP DISORDER: Status: ACTIVE | Noted: 2021-06-23

## 2022-03-20 PROBLEM — G47.00 PERSISTENT DISORDER OF INITIATING OR MAINTAINING SLEEP: Status: ACTIVE | Noted: 2021-06-23

## 2023-08-08 ENCOUNTER — HOSPITAL ENCOUNTER (EMERGENCY)
Age: 43
Discharge: LEFT BEFORE DISCHARGE | End: 2023-08-08
Attending: EMERGENCY MEDICINE

## 2023-08-08 VITALS
BODY MASS INDEX: 29.8 KG/M2 | TEMPERATURE: 98 F | RESPIRATION RATE: 14 BRPM | SYSTOLIC BLOOD PRESSURE: 122 MMHG | WEIGHT: 220 LBS | OXYGEN SATURATION: 96 % | HEIGHT: 72 IN | DIASTOLIC BLOOD PRESSURE: 87 MMHG | HEART RATE: 99 BPM

## 2023-08-08 DIAGNOSIS — F10.921 ACUTE ALCOHOLIC INTOXICATION WITH DELIRIUM (HCC): Primary | ICD-10-CM

## 2023-08-08 LAB
ALBUMIN SERPL-MCNC: 3.7 G/DL (ref 3.5–5)
ALBUMIN/GLOB SERPL: 1.2 (ref 0.4–1.6)
ALP SERPL-CCNC: 75 U/L (ref 50–136)
ALT SERPL-CCNC: 68 U/L (ref 12–65)
ANION GAP SERPL CALC-SCNC: 9 MMOL/L (ref 2–11)
AST SERPL-CCNC: 44 U/L (ref 15–37)
BASOPHILS # BLD: 0.1 K/UL (ref 0–0.2)
BASOPHILS NFR BLD: 1 % (ref 0–2)
BILIRUB SERPL-MCNC: 0.3 MG/DL (ref 0.2–1.1)
BUN SERPL-MCNC: 8 MG/DL (ref 6–23)
CALCIUM SERPL-MCNC: 9 MG/DL (ref 8.3–10.4)
CHLORIDE SERPL-SCNC: 109 MMOL/L (ref 101–110)
CO2 SERPL-SCNC: 23 MMOL/L (ref 21–32)
CREAT SERPL-MCNC: 1 MG/DL (ref 0.8–1.5)
DIFFERENTIAL METHOD BLD: ABNORMAL
EOSINOPHIL # BLD: 0.1 K/UL (ref 0–0.8)
EOSINOPHIL NFR BLD: 1 % (ref 0.5–7.8)
ERYTHROCYTE [DISTWIDTH] IN BLOOD BY AUTOMATED COUNT: 13.1 % (ref 11.9–14.6)
ETHANOL SERPL-MCNC: 391 MG/DL (ref 0–0.08)
GLOBULIN SER CALC-MCNC: 3.2 G/DL (ref 2.8–4.5)
GLUCOSE SERPL-MCNC: 104 MG/DL (ref 65–100)
HCT VFR BLD AUTO: 48.6 % (ref 41.1–50.3)
HGB BLD-MCNC: 16.7 G/DL (ref 13.6–17.2)
IMM GRANULOCYTES # BLD AUTO: 0 K/UL (ref 0–0.5)
IMM GRANULOCYTES NFR BLD AUTO: 0 % (ref 0–5)
LYMPHOCYTES # BLD: 3.5 K/UL (ref 0.5–4.6)
LYMPHOCYTES NFR BLD: 52 % (ref 13–44)
MCH RBC QN AUTO: 30.5 PG (ref 26.1–32.9)
MCHC RBC AUTO-ENTMCNC: 34.4 G/DL (ref 31.4–35)
MCV RBC AUTO: 88.8 FL (ref 82–102)
MONOCYTES # BLD: 0.4 K/UL (ref 0.1–1.3)
MONOCYTES NFR BLD: 5 % (ref 4–12)
NEUTS SEG # BLD: 2.8 K/UL (ref 1.7–8.2)
NEUTS SEG NFR BLD: 41 % (ref 43–78)
NRBC # BLD: 0 K/UL (ref 0–0.2)
PLATELET # BLD AUTO: 421 K/UL (ref 150–450)
PMV BLD AUTO: 9.2 FL (ref 9.4–12.3)
POTASSIUM SERPL-SCNC: 4.5 MMOL/L (ref 3.5–5.1)
PROT SERPL-MCNC: 6.9 G/DL (ref 6.3–8.2)
RBC # BLD AUTO: 5.47 M/UL (ref 4.23–5.6)
SODIUM SERPL-SCNC: 141 MMOL/L (ref 133–143)
WBC # BLD AUTO: 6.8 K/UL (ref 4.3–11.1)

## 2023-08-08 PROCEDURE — 80053 COMPREHEN METABOLIC PANEL: CPT

## 2023-08-08 PROCEDURE — 99284 EMERGENCY DEPT VISIT MOD MDM: CPT

## 2023-08-08 PROCEDURE — 85025 COMPLETE CBC W/AUTO DIFF WBC: CPT

## 2023-08-08 PROCEDURE — 82077 ASSAY SPEC XCP UR&BREATH IA: CPT

## 2023-08-08 PROCEDURE — 94762 N-INVAS EAR/PLS OXIMTRY CONT: CPT

## 2023-08-08 PROCEDURE — 2580000003 HC RX 258: Performed by: EMERGENCY MEDICINE

## 2023-08-08 RX ORDER — 0.9 % SODIUM CHLORIDE 0.9 %
1000 INTRAVENOUS SOLUTION INTRAVENOUS ONCE
Status: COMPLETED | OUTPATIENT
Start: 2023-08-08 | End: 2023-08-08

## 2023-08-08 RX ADMIN — SODIUM CHLORIDE 1000 ML: 9 INJECTION, SOLUTION INTRAVENOUS at 14:43

## 2023-08-08 ASSESSMENT — LIFESTYLE VARIABLES
HOW MANY STANDARD DRINKS CONTAINING ALCOHOL DO YOU HAVE ON A TYPICAL DAY: 5 OR 6
HOW OFTEN DO YOU HAVE A DRINK CONTAINING ALCOHOL: 4 OR MORE TIMES A WEEK

## 2023-08-08 ASSESSMENT — ENCOUNTER SYMPTOMS
VOMITING: 0
DIARRHEA: 0
BACK PAIN: 0
ABDOMINAL PAIN: 0
SHORTNESS OF BREATH: 0
NAUSEA: 0

## 2023-08-08 ASSESSMENT — PAIN - FUNCTIONAL ASSESSMENT: PAIN_FUNCTIONAL_ASSESSMENT: NONE - DENIES PAIN

## 2023-08-08 NOTE — ED TRIAGE NOTES
Pt arrives via [facility] from home. Slumped in the driveway. Drinking 9 bottles of liquor according to ex wife. Incontinent of urine. A/ox2. Name and birthday. 105HR. 119/81. 95% RA. Bgl 122.

## 2023-08-08 NOTE — ED NOTES
Pt ambulatory with no issues. No assists. Pt pulled IV access out. Pt denies any needs. Call light within reach.      Jannet Jones RN  08/08/23 [id number]

## 2023-08-08 NOTE — ED PROVIDER NOTES
All other systems reviewed and are negative. Review of Systems   Constitutional:  Negative for fever. Eyes:  Negative for visual disturbance. Respiratory:  Negative for shortness of breath. Cardiovascular:  Negative for chest pain. Gastrointestinal:  Negative for abdominal pain, diarrhea, nausea and vomiting. Musculoskeletal:  Negative for back pain and neck pain. Neurological:  Negative for headaches. Psychiatric/Behavioral:  Negative for dysphoric mood and suicidal ideas. Past Medical History:   Diagnosis Date    ETOH abuse     Hypertension     Sleep apnea         Past Surgical History:   Procedure Laterality Date    UPPER GASTROINTESTINAL ENDOSCOPY  7/12/2021             Family History   Problem Relation Age of Onset    Hypertension Father     Stroke Father         Social History     Socioeconomic History    Marital status: Single   Tobacco Use    Smoking status: Never    Smokeless tobacco: Never   Substance and Sexual Activity    Alcohol use: Not Currently    Drug use: No        Allergies: Celecoxib and Penicillins    Previous Medications    No medications on file        Vitals signs and nursing note reviewed. Patient Vitals for the past 4 hrs:   BP   08/08/23 1430 122/87          Physical Exam  Vitals and nursing note reviewed. Constitutional:       Appearance: Normal appearance. HENT:      Head: Normocephalic and atraumatic. Cardiovascular:      Rate and Rhythm: Normal rate and regular rhythm. Pulses: Normal pulses. Heart sounds: Normal heart sounds. Pulmonary:      Effort: Pulmonary effort is normal.      Breath sounds: Normal breath sounds. Abdominal:      General: Abdomen is flat. Bowel sounds are normal.      Palpations: Abdomen is soft. Tenderness: There is no abdominal tenderness. Musculoskeletal:         General: No swelling or tenderness. Cervical back: Normal range of motion and neck supple. No tenderness.    Skin:     General: Skin is warm

## (undated) DEVICE — BLOCK BITE AD 60FR W/ VELC STRP ADDRESSES MOST PT AND

## (undated) DEVICE — CONTAINER PREFIL FRMLN 40ML --

## (undated) DEVICE — CANNULA NSL ORAL AD FOR CAPNOFLEX CO2 O2 AIRLFE

## (undated) DEVICE — FORCEPS BX L240CM JAW DIA2.8MM L CAP W/ NDL MIC MESH TOOTH

## (undated) DEVICE — CONNECTOR TBNG OD5-7MM O2 END DISP

## (undated) DEVICE — KENDALL RADIOLUCENT FOAM MONITORING ELECTRODE RECTANGULAR SHAPE: Brand: KENDALL